# Patient Record
Sex: FEMALE | Race: WHITE | Employment: FULL TIME | URBAN - METROPOLITAN AREA
[De-identification: names, ages, dates, MRNs, and addresses within clinical notes are randomized per-mention and may not be internally consistent; named-entity substitution may affect disease eponyms.]

---

## 2017-03-28 ENCOUNTER — HOSPITAL ENCOUNTER (OUTPATIENT)
Dept: OTHER | Age: 32
Discharge: HOME | End: 2017-03-28

## 2017-03-28 DIAGNOSIS — I77.71 DISSECTION OF CAROTID ARTERY (HCC): ICD-10-CM

## 2017-04-04 ENCOUNTER — EMPLOYEE WELLNESS (OUTPATIENT)
Dept: OTHER | Age: 32
End: 2017-04-04

## 2017-04-04 LAB
CHOLESTEROL, TOTAL: 131 MG/DL (ref 0–199)
GLUCOSE BLD-MCNC: 86 MG/DL (ref 70–99)
HDLC SERPL-MCNC: 58 MG/DL (ref 40–60)
LDL CHOLESTEROL CALCULATED: 58 MG/DL
TRIGL SERPL-MCNC: 75 MG/DL (ref 0–150)

## 2018-03-20 VITALS — BODY MASS INDEX: 25.92 KG/M2 | WEIGHT: 151 LBS

## 2018-04-03 ENCOUNTER — EMPLOYEE WELLNESS (OUTPATIENT)
Dept: OTHER | Age: 33
End: 2018-04-03

## 2018-04-03 LAB
CHOLESTEROL, TOTAL: 149 MG/DL (ref 0–199)
GLUCOSE BLD-MCNC: 89 MG/DL (ref 70–99)
HDLC SERPL-MCNC: 64 MG/DL (ref 40–60)
LDL CHOLESTEROL CALCULATED: 69 MG/DL
TRIGL SERPL-MCNC: 80 MG/DL (ref 0–150)

## 2018-04-10 VITALS — WEIGHT: 154 LBS | BODY MASS INDEX: 26.43 KG/M2

## 2018-05-05 ENCOUNTER — NURSE TRIAGE (OUTPATIENT)
Dept: OTHER | Facility: CLINIC | Age: 33
End: 2018-05-05

## 2018-10-02 ENCOUNTER — HOSPITAL ENCOUNTER (OUTPATIENT)
Age: 33
Discharge: HOME | End: 2018-10-02

## 2018-10-02 LAB — TSH SERPL DL<=0.05 MIU/L-ACNC: 2.59 UIU/ML (ref 0.27–4.2)

## 2018-10-02 PROCEDURE — 84443 ASSAY THYROID STIM HORMONE: CPT

## 2018-10-02 PROCEDURE — 36415 COLL VENOUS BLD VENIPUNCTURE: CPT

## 2019-04-05 ENCOUNTER — HOSPITAL ENCOUNTER (OUTPATIENT)
Age: 34
Discharge: HOME | End: 2019-04-05
Payer: COMMERCIAL

## 2019-04-05 LAB
A/G RATIO: 1.5 (ref 1.1–2.2)
ALBUMIN SERPL-MCNC: 4.3 G/DL (ref 3.4–5)
ALP BLD-CCNC: 61 U/L (ref 40–129)
ALT SERPL-CCNC: 20 U/L (ref 10–40)
ANION GAP SERPL CALCULATED.3IONS-SCNC: 11 MMOL/L (ref 3–16)
AST SERPL-CCNC: 19 U/L (ref 15–37)
BILIRUB SERPL-MCNC: 0.3 MG/DL (ref 0–1)
BILIRUBIN DIRECT: <0.2 MG/DL (ref 0–0.3)
BILIRUBIN, INDIRECT: NORMAL MG/DL (ref 0–1)
BUN BLDV-MCNC: 11 MG/DL (ref 7–20)
CALCIUM SERPL-MCNC: 8.9 MG/DL (ref 8.3–10.6)
CHLORIDE BLD-SCNC: 106 MMOL/L (ref 99–110)
CHOLESTEROL, FASTING: 138 MG/DL (ref 0–199)
CO2: 23 MMOL/L (ref 21–32)
CREAT SERPL-MCNC: 0.6 MG/DL (ref 0.6–1.1)
ESTIMATED AVERAGE GLUCOSE: 99.7 MG/DL
GFR AFRICAN AMERICAN: >60
GFR NON-AFRICAN AMERICAN: >60
GLOBULIN: 2.8 G/DL
GLUCOSE FASTING: 103 MG/DL (ref 70–99)
HBA1C MFR BLD: 5.1 %
HCT VFR BLD CALC: 42.2 % (ref 36–48)
HDLC SERPL-MCNC: 65 MG/DL (ref 40–60)
HEMOGLOBIN: 14.2 G/DL (ref 12–16)
LDL CHOLESTEROL CALCULATED: 63 MG/DL
MCH RBC QN AUTO: 31.7 PG (ref 26–34)
MCHC RBC AUTO-ENTMCNC: 33.7 G/DL (ref 31–36)
MCV RBC AUTO: 94.1 FL (ref 80–100)
PDW BLD-RTO: 13 % (ref 12.4–15.4)
PLATELET # BLD: 221 K/UL (ref 135–450)
PMV BLD AUTO: 9.8 FL (ref 5–10.5)
POTASSIUM SERPL-SCNC: 4.2 MMOL/L (ref 3.5–5.1)
RBC # BLD: 4.49 M/UL (ref 4–5.2)
SODIUM BLD-SCNC: 140 MMOL/L (ref 136–145)
TOTAL PROTEIN: 7.1 G/DL (ref 6.4–8.2)
TRIGLYCERIDE, FASTING: 52 MG/DL (ref 0–150)
VLDLC SERPL CALC-MCNC: 10 MG/DL
WBC # BLD: 8 K/UL (ref 4–11)

## 2019-04-05 PROCEDURE — 85027 COMPLETE CBC AUTOMATED: CPT

## 2019-04-05 PROCEDURE — 83036 HEMOGLOBIN GLYCOSYLATED A1C: CPT

## 2019-04-05 PROCEDURE — 80061 LIPID PANEL: CPT

## 2019-04-05 PROCEDURE — 80053 COMPREHEN METABOLIC PANEL: CPT

## 2019-04-05 PROCEDURE — 36415 COLL VENOUS BLD VENIPUNCTURE: CPT

## 2019-10-10 RX ORDER — VALACYCLOVIR HYDROCHLORIDE 500 MG/1
500 TABLET, FILM COATED ORAL PRN
COMMUNITY

## 2019-10-10 RX ORDER — PHENTERMINE HYDROCHLORIDE 37.5 MG/1
37.5 TABLET ORAL
Status: ON HOLD | COMMUNITY
End: 2022-04-01 | Stop reason: HOSPADM

## 2019-10-11 ENCOUNTER — PREP FOR PROCEDURE (OUTPATIENT)
Dept: LABOR AND DELIVERY | Age: 34
End: 2019-10-11

## 2019-10-11 RX ORDER — SODIUM CHLORIDE 0.9 % (FLUSH) 0.9 %
10 SYRINGE (ML) INJECTION PRN
Status: CANCELLED | OUTPATIENT
Start: 2019-10-18

## 2019-10-11 RX ORDER — SODIUM CHLORIDE 0.9 % (FLUSH) 0.9 %
10 SYRINGE (ML) INJECTION EVERY 12 HOURS SCHEDULED
Status: CANCELLED | OUTPATIENT
Start: 2019-10-18

## 2019-10-18 ENCOUNTER — ANESTHESIA (OUTPATIENT)
Dept: OPERATING ROOM | Age: 34
End: 2019-10-18
Payer: COMMERCIAL

## 2019-10-18 ENCOUNTER — ANESTHESIA EVENT (OUTPATIENT)
Dept: OPERATING ROOM | Age: 34
End: 2019-10-18
Payer: COMMERCIAL

## 2019-10-18 ENCOUNTER — HOSPITAL ENCOUNTER (OUTPATIENT)
Age: 34
Setting detail: OUTPATIENT SURGERY
Discharge: HOME | End: 2019-10-18
Attending: OBSTETRICS & GYNECOLOGY | Admitting: OBSTETRICS & GYNECOLOGY
Payer: COMMERCIAL

## 2019-10-18 VITALS
TEMPERATURE: 97.5 F | OXYGEN SATURATION: 96 % | SYSTOLIC BLOOD PRESSURE: 114 MMHG | HEIGHT: 64 IN | HEART RATE: 71 BPM | WEIGHT: 150 LBS | BODY MASS INDEX: 25.61 KG/M2 | DIASTOLIC BLOOD PRESSURE: 79 MMHG | RESPIRATION RATE: 9 BRPM

## 2019-10-18 VITALS
OXYGEN SATURATION: 97 % | DIASTOLIC BLOOD PRESSURE: 55 MMHG | SYSTOLIC BLOOD PRESSURE: 99 MMHG | RESPIRATION RATE: 3 BRPM

## 2019-10-18 DIAGNOSIS — N92.0 EXCESSIVE AND FREQUENT MENSTRUATION: ICD-10-CM

## 2019-10-18 LAB
HCT VFR BLD CALC: 39.5 % (ref 36–48)
HEMOGLOBIN: 13.8 G/DL (ref 12–16)
MCH RBC QN AUTO: 32.9 PG (ref 26–34)
MCHC RBC AUTO-ENTMCNC: 35 G/DL (ref 31–36)
MCV RBC AUTO: 94 FL (ref 80–100)
PDW BLD-RTO: 12.5 % (ref 12.4–15.4)
PLATELET # BLD: 251 K/UL (ref 135–450)
PMV BLD AUTO: 8.7 FL (ref 5–10.5)
PREGNANCY, URINE: NEGATIVE
RBC # BLD: 4.2 M/UL (ref 4–5.2)
WBC # BLD: 8.7 K/UL (ref 4–11)

## 2019-10-18 PROCEDURE — 3600000013 HC SURGERY LEVEL 3 ADDTL 15MIN: Performed by: OBSTETRICS & GYNECOLOGY

## 2019-10-18 PROCEDURE — 6360000002 HC RX W HCPCS: Performed by: NURSE ANESTHETIST, CERTIFIED REGISTERED

## 2019-10-18 PROCEDURE — 88305 TISSUE EXAM BY PATHOLOGIST: CPT

## 2019-10-18 PROCEDURE — 2580000003 HC RX 258: Performed by: OBSTETRICS & GYNECOLOGY

## 2019-10-18 PROCEDURE — 84703 CHORIONIC GONADOTROPIN ASSAY: CPT

## 2019-10-18 PROCEDURE — 2720000010 HC SURG SUPPLY STERILE: Performed by: OBSTETRICS & GYNECOLOGY

## 2019-10-18 PROCEDURE — 2580000003 HC RX 258: Performed by: ANESTHESIOLOGY

## 2019-10-18 PROCEDURE — 2709999900 HC NON-CHARGEABLE SUPPLY: Performed by: OBSTETRICS & GYNECOLOGY

## 2019-10-18 PROCEDURE — 3700000001 HC ADD 15 MINUTES (ANESTHESIA): Performed by: OBSTETRICS & GYNECOLOGY

## 2019-10-18 PROCEDURE — 7100000011 HC PHASE II RECOVERY - ADDTL 15 MIN: Performed by: OBSTETRICS & GYNECOLOGY

## 2019-10-18 PROCEDURE — 3600000003 HC SURGERY LEVEL 3 BASE: Performed by: OBSTETRICS & GYNECOLOGY

## 2019-10-18 PROCEDURE — 3700000000 HC ANESTHESIA ATTENDED CARE: Performed by: OBSTETRICS & GYNECOLOGY

## 2019-10-18 PROCEDURE — 6370000000 HC RX 637 (ALT 250 FOR IP): Performed by: ANESTHESIOLOGY

## 2019-10-18 PROCEDURE — 2500000003 HC RX 250 WO HCPCS: Performed by: NURSE ANESTHETIST, CERTIFIED REGISTERED

## 2019-10-18 PROCEDURE — 85027 COMPLETE CBC AUTOMATED: CPT

## 2019-10-18 PROCEDURE — 7100000010 HC PHASE II RECOVERY - FIRST 15 MIN: Performed by: OBSTETRICS & GYNECOLOGY

## 2019-10-18 RX ORDER — MORPHINE SULFATE 2 MG/ML
2 INJECTION, SOLUTION INTRAMUSCULAR; INTRAVENOUS EVERY 5 MIN PRN
Status: DISCONTINUED | OUTPATIENT
Start: 2019-10-18 | End: 2019-10-18 | Stop reason: HOSPADM

## 2019-10-18 RX ORDER — SODIUM CHLORIDE 0.9 % (FLUSH) 0.9 %
10 SYRINGE (ML) INJECTION EVERY 12 HOURS SCHEDULED
Status: DISCONTINUED | OUTPATIENT
Start: 2019-10-18 | End: 2019-10-18 | Stop reason: HOSPADM

## 2019-10-18 RX ORDER — FENTANYL CITRATE 50 UG/ML
INJECTION, SOLUTION INTRAMUSCULAR; INTRAVENOUS PRN
Status: DISCONTINUED | OUTPATIENT
Start: 2019-10-18 | End: 2019-10-18 | Stop reason: SDUPTHER

## 2019-10-18 RX ORDER — DIPHENHYDRAMINE HYDROCHLORIDE 50 MG/ML
12.5 INJECTION INTRAMUSCULAR; INTRAVENOUS
Status: DISCONTINUED | OUTPATIENT
Start: 2019-10-18 | End: 2019-10-18 | Stop reason: HOSPADM

## 2019-10-18 RX ORDER — LIDOCAINE HYDROCHLORIDE 10 MG/ML
0.3 INJECTION, SOLUTION EPIDURAL; INFILTRATION; INTRACAUDAL; PERINEURAL
Status: DISCONTINUED | OUTPATIENT
Start: 2019-10-18 | End: 2019-10-18 | Stop reason: HOSPADM

## 2019-10-18 RX ORDER — DEXAMETHASONE SODIUM PHOSPHATE 10 MG/ML
INJECTION INTRAMUSCULAR; INTRAVENOUS PRN
Status: DISCONTINUED | OUTPATIENT
Start: 2019-10-18 | End: 2019-10-18 | Stop reason: SDUPTHER

## 2019-10-18 RX ORDER — MORPHINE SULFATE 2 MG/ML
1 INJECTION, SOLUTION INTRAMUSCULAR; INTRAVENOUS EVERY 5 MIN PRN
Status: DISCONTINUED | OUTPATIENT
Start: 2019-10-18 | End: 2019-10-18 | Stop reason: HOSPADM

## 2019-10-18 RX ORDER — ONDANSETRON 2 MG/ML
4 INJECTION INTRAMUSCULAR; INTRAVENOUS
Status: DISCONTINUED | OUTPATIENT
Start: 2019-10-18 | End: 2019-10-18 | Stop reason: HOSPADM

## 2019-10-18 RX ORDER — LIDOCAINE HYDROCHLORIDE 20 MG/ML
INJECTION, SOLUTION INFILTRATION; PERINEURAL PRN
Status: DISCONTINUED | OUTPATIENT
Start: 2019-10-18 | End: 2019-10-18 | Stop reason: SDUPTHER

## 2019-10-18 RX ORDER — SODIUM CHLORIDE, SODIUM LACTATE, POTASSIUM CHLORIDE, CALCIUM CHLORIDE 600; 310; 30; 20 MG/100ML; MG/100ML; MG/100ML; MG/100ML
INJECTION, SOLUTION INTRAVENOUS CONTINUOUS
Status: DISCONTINUED | OUTPATIENT
Start: 2019-10-18 | End: 2019-10-18 | Stop reason: HOSPADM

## 2019-10-18 RX ORDER — MIDAZOLAM HYDROCHLORIDE 1 MG/ML
INJECTION INTRAMUSCULAR; INTRAVENOUS PRN
Status: DISCONTINUED | OUTPATIENT
Start: 2019-10-18 | End: 2019-10-18 | Stop reason: SDUPTHER

## 2019-10-18 RX ORDER — OXYCODONE HYDROCHLORIDE AND ACETAMINOPHEN 5; 325 MG/1; MG/1
2 TABLET ORAL PRN
Status: COMPLETED | OUTPATIENT
Start: 2019-10-18 | End: 2019-10-18

## 2019-10-18 RX ORDER — PROPOFOL 10 MG/ML
INJECTION, EMULSION INTRAVENOUS PRN
Status: DISCONTINUED | OUTPATIENT
Start: 2019-10-18 | End: 2019-10-18 | Stop reason: SDUPTHER

## 2019-10-18 RX ORDER — PROMETHAZINE HYDROCHLORIDE 25 MG/ML
6.25 INJECTION, SOLUTION INTRAMUSCULAR; INTRAVENOUS
Status: DISCONTINUED | OUTPATIENT
Start: 2019-10-18 | End: 2019-10-18 | Stop reason: HOSPADM

## 2019-10-18 RX ORDER — SODIUM CHLORIDE, SODIUM LACTATE, POTASSIUM CHLORIDE, AND CALCIUM CHLORIDE .6; .31; .03; .02 G/100ML; G/100ML; G/100ML; G/100ML
IRRIGANT IRRIGATION PRN
Status: DISCONTINUED | OUTPATIENT
Start: 2019-10-18 | End: 2019-10-18 | Stop reason: ALTCHOICE

## 2019-10-18 RX ORDER — KETOROLAC TROMETHAMINE 30 MG/ML
INJECTION, SOLUTION INTRAMUSCULAR; INTRAVENOUS PRN
Status: DISCONTINUED | OUTPATIENT
Start: 2019-10-18 | End: 2019-10-18 | Stop reason: SDUPTHER

## 2019-10-18 RX ORDER — OXYCODONE HYDROCHLORIDE AND ACETAMINOPHEN 5; 325 MG/1; MG/1
1 TABLET ORAL PRN
Status: COMPLETED | OUTPATIENT
Start: 2019-10-18 | End: 2019-10-18

## 2019-10-18 RX ORDER — SODIUM CHLORIDE 0.9 % (FLUSH) 0.9 %
10 SYRINGE (ML) INJECTION PRN
Status: DISCONTINUED | OUTPATIENT
Start: 2019-10-18 | End: 2019-10-18 | Stop reason: HOSPADM

## 2019-10-18 RX ORDER — LABETALOL HYDROCHLORIDE 5 MG/ML
5 INJECTION, SOLUTION INTRAVENOUS EVERY 10 MIN PRN
Status: DISCONTINUED | OUTPATIENT
Start: 2019-10-18 | End: 2019-10-18 | Stop reason: HOSPADM

## 2019-10-18 RX ORDER — MAGNESIUM HYDROXIDE 1200 MG/15ML
LIQUID ORAL CONTINUOUS PRN
Status: COMPLETED | OUTPATIENT
Start: 2019-10-18 | End: 2019-10-18

## 2019-10-18 RX ORDER — ONDANSETRON 2 MG/ML
INJECTION INTRAMUSCULAR; INTRAVENOUS PRN
Status: DISCONTINUED | OUTPATIENT
Start: 2019-10-18 | End: 2019-10-18 | Stop reason: SDUPTHER

## 2019-10-18 RX ORDER — HYDRALAZINE HYDROCHLORIDE 20 MG/ML
5 INJECTION INTRAMUSCULAR; INTRAVENOUS EVERY 10 MIN PRN
Status: DISCONTINUED | OUTPATIENT
Start: 2019-10-18 | End: 2019-10-18 | Stop reason: HOSPADM

## 2019-10-18 RX ORDER — MEPERIDINE HYDROCHLORIDE 50 MG/ML
12.5 INJECTION INTRAMUSCULAR; INTRAVENOUS; SUBCUTANEOUS EVERY 5 MIN PRN
Status: DISCONTINUED | OUTPATIENT
Start: 2019-10-18 | End: 2019-10-18 | Stop reason: HOSPADM

## 2019-10-18 RX ORDER — KETOROLAC TROMETHAMINE 30 MG/ML
INJECTION, SOLUTION INTRAMUSCULAR; INTRAVENOUS
Status: COMPLETED
Start: 2019-10-18 | End: 2019-10-18

## 2019-10-18 RX ADMIN — OXYCODONE HYDROCHLORIDE AND ACETAMINOPHEN 1 TABLET: 5; 325 TABLET ORAL at 16:18

## 2019-10-18 RX ADMIN — PROPOFOL 200 MG: 10 INJECTION, EMULSION INTRAVENOUS at 15:21

## 2019-10-18 RX ADMIN — MIDAZOLAM HYDROCHLORIDE 2 MG: 2 INJECTION, SOLUTION INTRAMUSCULAR; INTRAVENOUS at 15:16

## 2019-10-18 RX ADMIN — DEXAMETHASONE SODIUM PHOSPHATE 10 MG: 10 INJECTION INTRAMUSCULAR; INTRAVENOUS at 15:21

## 2019-10-18 RX ADMIN — LIDOCAINE HYDROCHLORIDE 60 MG: 20 INJECTION, SOLUTION INFILTRATION; PERINEURAL at 15:21

## 2019-10-18 RX ADMIN — ONDANSETRON 4 MG: 2 INJECTION INTRAMUSCULAR; INTRAVENOUS at 15:21

## 2019-10-18 RX ADMIN — SODIUM CHLORIDE, POTASSIUM CHLORIDE, SODIUM LACTATE AND CALCIUM CHLORIDE: 600; 310; 30; 20 INJECTION, SOLUTION INTRAVENOUS at 13:22

## 2019-10-18 RX ADMIN — FENTANYL CITRATE 25 MCG: 50 INJECTION INTRAMUSCULAR; INTRAVENOUS at 15:21

## 2019-10-18 RX ADMIN — FENTANYL CITRATE 50 MCG: 50 INJECTION INTRAMUSCULAR; INTRAVENOUS at 15:33

## 2019-10-18 RX ADMIN — KETOROLAC TROMETHAMINE 30 MG: 30 INJECTION, SOLUTION INTRAMUSCULAR; INTRAVENOUS at 16:02

## 2019-10-18 ASSESSMENT — PAIN SCALES - GENERAL
PAINLEVEL_OUTOF10: 4
PAINLEVEL_OUTOF10: 0
PAINLEVEL_OUTOF10: 0
PAINLEVEL_OUTOF10: 3
PAINLEVEL_OUTOF10: 0

## 2019-10-18 ASSESSMENT — PAIN - FUNCTIONAL ASSESSMENT: PAIN_FUNCTIONAL_ASSESSMENT: 0-10

## 2019-10-18 ASSESSMENT — PULMONARY FUNCTION TESTS
PIF_VALUE: 13
PIF_VALUE: 10
PIF_VALUE: 12
PIF_VALUE: 10
PIF_VALUE: 12
PIF_VALUE: 2
PIF_VALUE: 2
PIF_VALUE: 11
PIF_VALUE: 9
PIF_VALUE: 13
PIF_VALUE: 9
PIF_VALUE: 12
PIF_VALUE: 14
PIF_VALUE: 2
PIF_VALUE: 9
PIF_VALUE: 9
PIF_VALUE: 0
PIF_VALUE: 9
PIF_VALUE: 9
PIF_VALUE: 13
PIF_VALUE: 13
PIF_VALUE: 18
PIF_VALUE: 9
PIF_VALUE: 13
PIF_VALUE: 9
PIF_VALUE: 9
PIF_VALUE: 10
PIF_VALUE: 3
PIF_VALUE: 0
PIF_VALUE: 9
PIF_VALUE: 13
PIF_VALUE: 10
PIF_VALUE: 9
PIF_VALUE: 9

## 2020-01-28 ENCOUNTER — OFFICE VISIT (OUTPATIENT)
Dept: FAMILY MEDICINE CLINIC | Age: 35
End: 2020-01-28
Payer: COMMERCIAL

## 2020-01-28 VITALS
SYSTOLIC BLOOD PRESSURE: 122 MMHG | OXYGEN SATURATION: 98 % | HEIGHT: 64 IN | BODY MASS INDEX: 24.69 KG/M2 | WEIGHT: 144.6 LBS | HEART RATE: 81 BPM | DIASTOLIC BLOOD PRESSURE: 70 MMHG

## 2020-01-28 PROCEDURE — 99385 PREV VISIT NEW AGE 18-39: CPT | Performed by: FAMILY MEDICINE

## 2020-01-28 ASSESSMENT — ENCOUNTER SYMPTOMS
EYES NEGATIVE: 1
RESPIRATORY NEGATIVE: 1
ALLERGIC/IMMUNOLOGIC NEGATIVE: 1
GASTROINTESTINAL NEGATIVE: 1

## 2020-01-28 ASSESSMENT — PATIENT HEALTH QUESTIONNAIRE - PHQ9
1. LITTLE INTEREST OR PLEASURE IN DOING THINGS: 0
2. FEELING DOWN, DEPRESSED OR HOPELESS: 0
SUM OF ALL RESPONSES TO PHQ QUESTIONS 1-9: 0
SUM OF ALL RESPONSES TO PHQ9 QUESTIONS 1 & 2: 0
SUM OF ALL RESPONSES TO PHQ QUESTIONS 1-9: 0

## 2020-01-28 NOTE — PATIENT INSTRUCTIONS

## 2020-01-28 NOTE — PROGRESS NOTES
2020    Rambo Zee (:  1985) is a 36 y.o. female, here for a preventive medicine evaluation. No concerns. Works in [employer] department at this building and at [facility].  but has long term boyfriend, mother of 3 boys. There is no problem list on file for this patient. Review of Systems   Constitutional: Negative. HENT: Negative. Eyes: Negative. Respiratory: Negative. Cardiovascular: Negative. Gastrointestinal: Negative. Endocrine: Negative. Genitourinary: Negative. Musculoskeletal: Negative. Skin: Negative. Allergic/Immunologic: Negative. Neurological: Negative. Hematological: Negative. Psychiatric/Behavioral: Negative. Prior to Visit Medications    Medication Sig Taking? Authorizing Provider   B Complex-Biotin-FA (MULTI-B COMPLEX PO) Take by mouth Yes Historical Provider, MD   BIOTIN MAXIMUM PO Take by mouth Yes Historical Provider, MD   VITAMIN D PO Take by mouth Yes Historical Provider, MD   Cyanocobalamin (VITAMIN B-12 ER PO) Take by mouth daily Yes Historical Provider, MD   phentermine (ADIPEX-P) 37.5 MG tablet Take 37.5 mg by mouth every morning (before breakfast). Yes Historical Provider, MD   valACYclovir (VALTREX) 500 MG tablet Take 500 mg by mouth as needed Yes Historical Provider, MD   ibuprofen (ADVIL;MOTRIN) 800 MG tablet Take 1 tablet by mouth every 6 hours as needed. Yes Jose E Delvalle MD        Allergies   Allergen Reactions    Sulfa Antibiotics Nausea And Vomiting       Past Medical History:   Diagnosis Date    Abnormal Pap smear of cervix     History of HPV infection     Oligohydramnios, antepartum     Placental abruption     3 yrs ago    PONV (postoperative nausea and vomiting)    Questionable h/o shingles or HSV (or both)?       Past Surgical History:   Procedure Laterality Date    COLPOSCOPY      DILATION AND CURETTAGE OF UTERUS N/A 10/18/2019    VIDEO HYSTEROSCOPY DILATATION AND Claudetta Kays performed by Vamsi Samson MD at [address]  10/18/2019    WISDOM TOOTH EXTRACTION      8 yrs ago       Social History     Socioeconomic History    Marital status:      Spouse name: Not on file    Number of children: Not on file    Years of education: Not on file    Highest education level: Not on file   Occupational History    Not on file   Social Needs    Financial resource strain: Not on file    Food insecurity:     Worry: Not on file     Inability: Not on file    Transportation needs:     Medical: Not on file     Non-medical: Not on file   Tobacco Use    Smoking status: Never Smoker    Smokeless tobacco: Never Used   Substance and Sexual Activity    Alcohol use: Yes     Comment: 6 drinks/week    Drug use: No    Sexual activity: Yes     Partners: Male   Lifestyle    Physical activity:     Days per week: Not on file     Minutes per session: Not on file    Stress: Not on file   Relationships    Social connections:     Talks on phone: Not on file     Gets together: Not on file     Attends Rastafarian service: Not on file     Active member of club or organization: Not on file     Attends meetings of clubs or organizations: Not on file     Relationship status: Not on file    Intimate partner violence:     Fear of current or ex partner: Not on file     Emotionally abused: Not on file     Physically abused: Not on file     Forced sexual activity: Not on file   Other Topics Concern    Not on file   Social History Narrative    Not on file        Family History   Problem Relation Age of Onset    High Blood Pressure Mother     Osteoporosis Mother     Other Mother         AVM    High Blood Pressure Father     Heart Disease Maternal Grandmother     Cancer Maternal Grandmother         skin    Alzheimer's Disease Paternal Grandmother     Other Maternal Grandfather         brain aneurysm    Colon Cancer Paternal Grandfather        ADVANCE DIRECTIVE: Y, Not Received    Vitals:    01/28/20 0951   BP: 122/70   Site: Right Upper Arm   Position: Sitting   Cuff Size: Small Adult   Pulse: 81   SpO2: 98%   Weight: 144 lb 9.6 oz (65.6 kg)   Height: 5' 4\" (1.626 m)     Estimated body mass index is 24.82 kg/m² as calculated from the following:    Height as of this encounter: 5' 4\" (1.626 m). Weight as of this encounter: 144 lb 9.6 oz (65.6 kg). Physical Exam  Vitals signs and nursing note reviewed. Constitutional:       General: She is not in acute distress. Appearance: Normal appearance. She is well-developed. HENT:      Head: Normocephalic and atraumatic. Right Ear: Hearing, tympanic membrane, ear canal and external ear normal.      Left Ear: Hearing, tympanic membrane, ear canal and external ear normal.      Nose: Nose normal.      Mouth/Throat:      Pharynx: Uvula midline. Eyes:      General: Lids are normal. No scleral icterus. Conjunctiva/sclera: Conjunctivae normal.      Pupils: Pupils are equal, round, and reactive to light. Neck:      Musculoskeletal: Neck supple. Trachea: Trachea normal.   Cardiovascular:      Rate and Rhythm: Normal rate and regular rhythm. Pulses: Normal pulses. Heart sounds: Normal heart sounds. Pulmonary:      Effort: Pulmonary effort is normal.      Breath sounds: Normal breath sounds. Abdominal:      General: Bowel sounds are normal. There is no distension. Palpations: Abdomen is soft. There is no mass. Tenderness: There is no abdominal tenderness. Hernia: No hernia is present. Musculoskeletal: Normal range of motion. Lymphadenopathy:      Cervical: No cervical adenopathy. Upper Body:      Right upper body: No supraclavicular adenopathy. Left upper body: No supraclavicular adenopathy. Skin:     General: Skin is warm and dry. Neurological:      Mental Status: She is alert and oriented to person, place, and time.       Cranial Nerves: No cranial nerve deficit. Coordination: Coordination normal.      Gait: Gait normal.   Psychiatric:         Speech: Speech normal.         Behavior: Behavior normal. Behavior is cooperative. Thought Content: Thought content normal.         Judgment: Judgment normal.         No flowsheet data found. Lab Results   Component Value Date    CHOL 149 04/03/2018    CHOL 131 04/04/2017    CHOLFAST 138 04/05/2019    TRIG 80 04/03/2018    TRIG 75 04/04/2017    TRIGLYCFAST 52 04/05/2019    HDL 65 04/05/2019    HDL 64 04/03/2018    HDL 58 04/04/2017    LDLCALC 63 04/05/2019    LDLCALC 69 04/03/2018    LDLCALC 58 04/04/2017    GLUF 103 04/05/2019    GLUCOSE 89 04/03/2018    LABA1C 5.1 04/05/2019       The ASCVD Risk score (Yariel Lawton, et al., 2013) failed to calculate for the following reasons: The 2013 ASCVD risk score is only valid for ages 36 to 78    Immunization History   Administered Date(s) Administered    Influenza Virus Vaccine 10/01/2018, 10/31/2019       Health Maintenance   Topic Date Due    DTaP/Tdap/Td vaccine (1 - Tdap) 04/24/1997    Cervical cancer screen  04/24/2007    Flu vaccine  Completed    HIV screen  Completed    Pneumococcal 0-64 years Vaccine  Aged Out    Varicella Vaccine  Discontinued       ASSESSMENT/PLAN:  1. Routine general medical examination at a health care facility  Health Maintenance reviewed - will get old records regarding pap smear and vaccines. Otherwise, she is up to date. She will have annual blood work through [facility] screening, which was normal less than a year ago. Return in about 1 year (around 1/28/2021) for Annual physical.    An electronic signature was used to authenticate this note.     --Jeovany Paniagua MD on 1/28/2020 at 10:09 AM

## 2020-07-29 ENCOUNTER — EMPLOYEE WELLNESS (OUTPATIENT)
Dept: OTHER | Age: 35
End: 2020-07-29

## 2020-07-29 LAB
CHOLESTEROL, TOTAL: 137 MG/DL (ref 0–199)
GLUCOSE BLD-MCNC: 86 MG/DL (ref 70–99)
HDLC SERPL-MCNC: 62 MG/DL (ref 40–60)
LDL CHOLESTEROL CALCULATED: 61 MG/DL
TRIGL SERPL-MCNC: 68 MG/DL (ref 0–150)

## 2020-10-19 VITALS — WEIGHT: 148 LBS | BODY MASS INDEX: 25.4 KG/M2

## 2021-06-21 LAB
CHOLESTEROL, TOTAL: 140 MG/DL (ref 0–199)
GLUCOSE BLD-MCNC: 89 MG/DL (ref 70–99)
HDLC SERPL-MCNC: 56 MG/DL (ref 40–60)
LDL CHOLESTEROL CALCULATED: 75 MG/DL
TRIGL SERPL-MCNC: 44 MG/DL (ref 0–150)

## 2021-11-08 ENCOUNTER — OFFICE VISIT (OUTPATIENT)
Dept: OBGYN CLINIC | Age: 36
End: 2021-11-08
Payer: COMMERCIAL

## 2021-11-08 VITALS
HEIGHT: 64 IN | WEIGHT: 150 LBS | HEART RATE: 93 BPM | SYSTOLIC BLOOD PRESSURE: 100 MMHG | DIASTOLIC BLOOD PRESSURE: 80 MMHG | BODY MASS INDEX: 25.61 KG/M2 | TEMPERATURE: 96.8 F

## 2021-11-08 DIAGNOSIS — Z87.42 HISTORY OF ABNORMAL CERVICAL PAP SMEAR: ICD-10-CM

## 2021-11-08 DIAGNOSIS — N93.9 ABNORMAL UTERINE BLEEDING (AUB): ICD-10-CM

## 2021-11-08 DIAGNOSIS — Z01.411 ENCNTR FOR GYN EXAM (GENERAL) (ROUTINE) W ABNORMAL FINDINGS: Primary | ICD-10-CM

## 2021-11-08 DIAGNOSIS — Z12.4 PAP SMEAR FOR CERVICAL CANCER SCREENING: ICD-10-CM

## 2021-11-08 PROCEDURE — 99385 PREV VISIT NEW AGE 18-39: CPT | Performed by: OBSTETRICS & GYNECOLOGY

## 2021-11-08 ASSESSMENT — ENCOUNTER SYMPTOMS
COUGH: 0
SORE THROAT: 0
VOMITING: 0
ABDOMINAL PAIN: 0
CONSTIPATION: 0
DIARRHEA: 0
NAUSEA: 0
SHORTNESS OF BREATH: 0

## 2021-11-08 NOTE — PROGRESS NOTES
Annual Exam      CC:   Chief Complaint   Patient presents with    New Patient       HPI:  29 y.o. [id number] presents for her gynecologic annual exam.    Patient seen and examined. Patient is doing well today     Patient had an endometrial ablation performed in  with Dr. Solomon Reynoso. Reports she did not have any bleeding until 2020 when menses have returned. States menses are regular, occurring monthly, lasting for 3-5 days. Are not has heavy as prior to ablation and is changing pads/tampons 4-5 times per day. Is having breakthrough bleeding every 1-2 months, typically presents that she has had her cycle and then a week later will have bleeding for a few days, or she will have light bleeding and then have a cycle a week later. Reports cramping has worsened following as well. Did not have a tubal ligation with ablation due to [facility], partner has a vasectomy. Reports poor tolerance to OCPs in the past due to mood changes. Had an IUD following and bled consistently with IUD. 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 Surgical history significant for endometrial ablation. Obstetric history significant for  x3. Denies history of shoulder dystocia, high order laceration, or postpartum hemorrhage. Denies history of GDM or GHTN. Reports oligohydramnios with each pregnancy.      Health Maintenance:  Birth control: Vasectomy   Pregnancy plans: None currently   Safe relationship: Yes - together since   Healthy diet: No specific plan, tries to keep balance   Exercise: treadmill 3x per week    Screening:  Last pap smear: 10/21/2020 - reports as normal   History of abnormal pap smears: Has had intermittent abnormal pap smears, last in 2018 with negative colposcopies following     Vaccines:  Gardasil vaccine: Has not had   Flu vaccine: Has had   COVID-19 vaccine: Has had     Review of Systems:   Review of Systems   Constitutional: Negative for chills and fever. HENT: Negative for congestion and sore throat. Respiratory: Negative for cough and shortness of breath. Cardiovascular: Negative for chest pain and palpitations. Gastrointestinal: Negative for abdominal pain, constipation, diarrhea, nausea and vomiting. Genitourinary: Positive for menstrual problem and pelvic pain. Negative for dysuria, frequency and vaginal discharge. Musculoskeletal: Negative. Skin: Negative. Neurological: Negative. Negative for headaches. Psychiatric/Behavioral: Negative. All other systems reviewed and are negative. Breast: Denies skin changes, nipple discharge, lesions, dimpling, tenderness or palpable masses     Primary Care Physician: Gae Cabot, MD    Obstetric History  OB History    Para Term  AB Living   3 3 3     3   SAB IAB Ectopic Molar Multiple Live Births             3      # Outcome Date GA Lbr Evin/2nd Weight Sex Delivery Anes PTL Lv   3 Term 14 37w5d 06:04 8 lb 11.5 oz (3.955 kg) M Vag-Spont   LUIS FERNANDO   2 Term 11   7 lb 9.6 oz (3.447 kg) M Vag-Spont   LUIS FERNANDO   1 Term 08   6 lb 1.9 oz (2.776 kg) M Vag-Spont   LUIS FERNANDO       GynecologicHistory  Menstrual History:   LMP: Patient's last menstrual period was 10/28/2021 (exact date).     Age of Menarche: 9   Menstrual Period: regular   Interval Between Menses: Monthly    Duration of Menses: 3-5 days   Menstrual Flow: Moderate    Bleeding between menses: Occasionally      Sexual History:   Contraception: see above   Currently is sexually active   5 Lifetime partners   Reports history of STIs - HSV1/2   Denies sexual problems    Pap History:   History of abnormal pap smears: see above   Last pap: see above      Medical History:  Past Medical History:   Diagnosis Date    Abnormal Pap smear of cervix     Herpes simplex virus (HSV) infection     History of HPV infection     Oligohydramnios, antepartum     Placental abruption     3 yrs ago    PONV (postoperative nausea and vomiting)        Medications:  Current Outpatient Medications   Medication Sig Dispense Refill    B Complex-Biotin-FA (MULTI-B COMPLEX PO) Take by mouth      BIOTIN MAXIMUM PO Take by mouth      VITAMIN D PO Take by mouth      Cyanocobalamin (VITAMIN B-12 ER PO) Take by mouth daily      phentermine (ADIPEX-P) 37.5 MG tablet Take 37.5 mg by mouth every morning (before breakfast).  valACYclovir (VALTREX) 500 MG tablet Take 500 mg by mouth as needed      ibuprofen (ADVIL;MOTRIN) 800 MG tablet Take 1 tablet by mouth every 6 hours as needed. 60 tablet 1     No current facility-administered medications for this visit. Surgical History:  Past Surgical History:   Procedure Laterality Date    COLPOSCOPY      DILATION AND CURETTAGE      DILATION AND CURETTAGE OF UTERUS N/A 10/18/2019    VIDEO HYSTEROSCOPY DILATATION AND CURETTAGE, NOVASURE ABLATION performed by Aníbal Jane MD at [address] [place] [address] Ave  10/18/2019    WISDOM TOOTH EXTRACTION      8 yrs ago       Allergies: Allergies   Allergen Reactions    Sulfa Antibiotics Nausea And Vomiting       Family History:  Family History   Problem Relation Age of Onset    High Blood Pressure Mother     Osteoporosis Mother     Other Mother         AVM    High Blood Pressure Father     Heart Disease Maternal Grandmother     Cancer Maternal Grandmother         skin    Alzheimer's Disease Paternal Grandmother     Other Maternal Grandfather         brain aneurysm    Colon Cancer Paternal Grandfather      Reports personal/family history of cervical, uterine, ovarian, vulvar, breast, or colon cancers.      - Paternal grandfather - colon cancer - in his 59s-75s  Denies personal/family history of bleeding or clotting disorders  Denies personal/family history of genetic disorders    Social History:  Social History     Socioeconomic History    Marital status:      Spouse name: None    Number of children: None  Years of education: None    Highest education level: None   Occupational History    None   Tobacco Use    Smoking status: Never Smoker    Smokeless tobacco: Never Used   Vaping Use    Vaping Use: Never used   Substance and Sexual Activity    Alcohol use: Yes     Comment: 6 drinks/week    Drug use: No    Sexual activity: Yes     Partners: Male   Other Topics Concern    None   Social History Narrative    None     Social Determinants of Health     Financial Resource Strain:     Difficulty of Paying Living Expenses: Not on file   Food Insecurity:     Worried About Running Out of Food in the Last Year: Not on file    Adis of Food in the Last Year: Not on file   Transportation Needs:     Lack of Transportation (Medical): Not on file    Lack of Transportation (Non-Medical): Not on file   Physical Activity:     Days of Exercise per Week: Not on file    Minutes of Exercise per Session: Not on file   Stress:     Feeling of Stress : Not on file   Social Connections:     Frequency of Communication with Friends and Family: Not on file    Frequency of Social Gatherings with Friends and Family: Not on file    Attends Baptism Services: Not on file    Active Member of [address] [place] [address] or Organizations: Not on file    Attends Club or Organization Meetings: Not on file    Marital Status: Not on file   Intimate Partner Violence:     Fear of Current or Ex-Partner: Not on file    Emotionally Abused: Not on file    Physically Abused: Not on file    Sexually Abused: Not on file   Housing Stability:     Unable to Pay for Housing in the Last Year: Not on file    Number of [place] in the Last Year: Not on file    Unstable Housing in the Last Year: Not on file       Objective: Body mass index is 25.75 kg/m².   /80 (Site: Left Upper Arm, Position: Sitting, Cuff Size: Medium Adult)   Pulse 93   Temp 96.8 °F (36 °C) (Infrared)   Ht 5' 4\" (1.626 m)   Wt 150 lb (68 kg)   LMP 10/28/2021 (Exact Date) results     - Age based screening recommendations discussed     - Self breast exams/awareness discussed with the patient     - Healthy lifestyle habits discussed including calcium and vitamin D supplementation     - Will follow-up in 1 year for annual exam     2. Pap smear for cervical cancer screening     - Patient with a history of abnormal pap smears as recently as 2018 with Colposcopy     - Repeated today with abnormal bleeding and cervical friability - though no lesions visualized     - Pap smear collected today - will call with results     - Age based screening recommendations discussed    3. Abnormal uterine bleeding (AUB)     - Patient is s/p endometrial ablation in 2019 with return of regular menses 3 months following ablation     - Reports moderate bleeding, though shorter than before with occasional spotting 1 week prior or 1 week after menses     - Reviewed differential of bleeding with patient and options for management     - Patient has not tolerated OCPs or IUD in the past     - Will rule out infection today as bleeding returned following procedure and cramping has worsened   - C.trachomatis N.gonorrhoeae DNA   - VAGINAL PATHOGENS PROBE *A     - Return precautions reviewed     - Will follow-up for US evaluation and consideration of EMB    4.  History of abnormal cervical Pap smear      Matt Pettit DO

## 2021-11-09 LAB
C TRACH DNA GENITAL QL NAA+PROBE: NEGATIVE
CANDIDA SPECIES, DNA PROBE: NORMAL
GARDNERELLA VAGINALIS, DNA PROBE: NORMAL
N. GONORRHOEAE DNA: NEGATIVE
TRICHOMONAS VAGINALIS DNA: NORMAL

## 2021-12-07 ENCOUNTER — OFFICE VISIT (OUTPATIENT)
Dept: OBGYN CLINIC | Age: 36
End: 2021-12-07
Payer: COMMERCIAL

## 2021-12-07 VITALS
BODY MASS INDEX: 25.3 KG/M2 | SYSTOLIC BLOOD PRESSURE: 110 MMHG | TEMPERATURE: 97.7 F | DIASTOLIC BLOOD PRESSURE: 74 MMHG | HEIGHT: 64 IN | HEART RATE: 81 BPM | WEIGHT: 148.2 LBS

## 2021-12-07 DIAGNOSIS — N93.9 ABNORMAL UTERINE BLEEDING (AUB): Primary | ICD-10-CM

## 2021-12-07 DIAGNOSIS — N94.6 DYSMENORRHEA: ICD-10-CM

## 2021-12-07 PROCEDURE — 76856 US EXAM PELVIC COMPLETE: CPT | Performed by: OBSTETRICS & GYNECOLOGY

## 2021-12-07 PROCEDURE — 99213 OFFICE O/P EST LOW 20 MIN: CPT | Performed by: OBSTETRICS & GYNECOLOGY

## 2021-12-07 NOTE — PROGRESS NOTES
Return Gyn Office Visit    CC:   Chief Complaint   Patient presents with    Follow-up     US       HPI:  Asael Cox is a 29 y.o. female who presents for US evaluation of abnormal uterine bleeding. Patient is seen and examined today. Patient is doing well today without complaints. Reports bleeding is unchanged. History of endometrial ablation in the end of 2019 with return of menses January 2020. Regular menses since that time. Reports occasional episodes of breakthrough bleeding. Reports increase in painful menses following endometrial ablation. Patient with a history of poor tolerance to OCPs due to mood changes as well as continuous bleeding with IUD. Denies chest pain, shortness of breath, fever, chills, nausea, vomiting. Review of Systems - The following ROS was otherwise negative, except as noted in the HPI: constitutional, respiratory, cardiovascular, gastrointestinal, genitourinary    Objective:  /74 (Site: Left Upper Arm, Position: Sitting, Cuff Size: Medium Adult)   Pulse 81   Temp 97.7 °F (36.5 °C) (Infrared)   Ht 5' 4\" (1.626 m)   Wt 148 lb 3.2 oz (67.2 kg)   LMP 11/26/2021 (Approximate)   BMI 25.44 kg/m²     Physical Exam  Vitals reviewed. Constitutional:       General: She is not in acute distress. Appearance: She is well-developed. HENT:      Head: Normocephalic and atraumatic. Eyes:      Conjunctiva/sclera: Conjunctivae normal.   Cardiovascular:      Rate and Rhythm: Normal rate. Pulmonary:      Effort: Pulmonary effort is normal. No respiratory distress. Skin:     General: Skin is warm and dry. Neurological:      Mental Status: She is alert and oriented to person, place, and time. Psychiatric:         Mood and Affect: Mood normal.         Behavior: Behavior normal.         Thought Content: Thought content normal.          Ultrasound:   PELVIC ULTRASOUND without DOPPLER INTERROGATION   NON OB    DATE: 12/07/2021    PHYSICIAN: RUBEN Nix President. OKiko SONOGRAPHER: [facility]    INDICATION: abnormal uterine bleeding    TYPE OF SCAN: vaginal    FINDINGS:    The cul de sac is normal. No free fluid appreciated. The cervix is normal and not enlarged. Nabothian cyst/s is not noted within the uterine cervix. The uterus measures 7.92 cm x 4.63 cm x 3.87 cm. The uterus is anteverted. The endometrium measures 4.88 mm. The myometrium is heterogeneous in appearance, suspected Adenomyosis. Posterior low Myoma noted measuring 1.1x1.2x1.1cm   No uterine anomalies are noted. The right ovary is present and normal.    The right ovary measures 2.76 cm x 2.11 cm x 2.28 cm. Ovary findings: No masses seen. The right adnexa is normal.    The left ovary is present and normal.    The left ovary measures 3.24 cm x 2.02 cm x 2.63 cm. Ovary findings: No masses seen. The left adnexa is normal.      IMPRESSION: Heterogenous uterus, suspect adenomyosis. 1.2cm posterior uterine fibroid. Normal right ovary. Normal left ovary. Imaging is limited secondary to bowel gas. The patient is well aware of the limitations of ultrasound in the detection of anomalies of the abdomen and pelvis. Assessment/Plan:     Sadaf Alex is a 29 y.o. female who presents for US evaluation of abnormal uterine bleeding. 1. Abnormal uterine bleeding (AUB)     - Patient is s/p endometrial ablation in 2019 with return of regular menses 3 months following ablation     - Reports moderate, painful bleeding, though shorter than before with occasional spotting 1 week prior or 1 week after menses     - Reviewed differential of bleeding with patient and options for management     - Patient has not tolerated OCPs or IUD in the past     - Infection screening was negative at last visit     - Pap smear NILM     - US today with findings of small posterior uterine fibroid and findings suggestive of adenomyosis.      - Risks, benefits and alternatives were reviewed with the patient including medical management and surgical intervention     - Patient declines addition of hormones at this time     - Patient is considering hysteroscopy D&C with evaluation for repeat ablation vs hysterectomy (VICK, MERVIN)     - Desires to discuss with her  and will call office     - Return precautions reviewed     - Will follow-up as needed     2.  Dysmenorrhea     - See above       Dallin Portillo,

## 2021-12-08 PROBLEM — N93.9 ABNORMAL UTERINE BLEEDING (AUB): Status: ACTIVE | Noted: 2021-12-08

## 2021-12-08 PROBLEM — N94.6 DYSMENORRHEA: Status: ACTIVE | Noted: 2021-12-08

## 2022-01-03 ENCOUNTER — TELEPHONE (OUTPATIENT)
Dept: OBGYN CLINIC | Age: 37
End: 2022-01-03

## 2022-01-04 NOTE — TELEPHONE ENCOUNTER
Called [employer] to see if a PA is needed for surgery, a PA is required started it today. Generally takes 14-21 days for a response. Will call and make patient aware.      Pending Auth number   [id number]    Faxed all clinical information to [phone]    Routing as a FYI

## 2022-01-26 ENCOUNTER — OFFICE VISIT (OUTPATIENT)
Dept: OBGYN CLINIC | Age: 37
End: 2022-01-26
Payer: COMMERCIAL

## 2022-01-26 VITALS
TEMPERATURE: 98.2 F | DIASTOLIC BLOOD PRESSURE: 80 MMHG | HEART RATE: 90 BPM | WEIGHT: 148.8 LBS | SYSTOLIC BLOOD PRESSURE: 104 MMHG | BODY MASS INDEX: 25.54 KG/M2

## 2022-01-26 DIAGNOSIS — Z32.02 NEGATIVE PREGNANCY TEST: ICD-10-CM

## 2022-01-26 DIAGNOSIS — N93.9 ABNORMAL UTERINE BLEEDING (AUB): Primary | ICD-10-CM

## 2022-01-26 LAB
CONTROL: NORMAL
PREGNANCY TEST URINE, POC: NEGATIVE

## 2022-01-26 PROCEDURE — 58100 BIOPSY OF UTERUS LINING: CPT | Performed by: OBSTETRICS & GYNECOLOGY

## 2022-01-26 PROCEDURE — 81025 URINE PREGNANCY TEST: CPT | Performed by: OBSTETRICS & GYNECOLOGY

## 2022-01-26 NOTE — PROGRESS NOTES
Endometrial Biopsy Procedure Note      Indication: Abnormal uterine bleeding    Urine pregnancy test: Negative    Consent:  The risks of the procedure were discussed with the patient. She was aware these risks include, but are not limited to bleeding, infection and damage to surrounding tissue. She elected to proceed with the endometrial biopsy. Written consent was obtained. Procedure Details: The patient was placed in the dorsal lithotomy position. A speculum was placed in the vagina, and the cervix was cleansed with betadine. A tenaculum was placed on the anterior lip of the cervix. The cervix was dilated. An endometrial pipelle was advanced to the fundus and a sample was gently collected. The uterus sounded to 7 cm. Specimen was adequate after 1 attempt(s). The tenaculum was removed and adequate hemostasis was noted. Patient tolerated the procedure well. EBL: Minimal     Complications: None    Plan:   Patient was instructed to take OTC motrin and/or tylenol for pain control  After care instructions discussed. Will follow up with results and status of prior authorization for upcoming surgical intervention.     Ruddy Koyanagi, DO

## 2022-02-14 NOTE — PROGRESS NOTES
Jocelyne Formosa    Age 29 y.o.    female    1985    MRN [id number]    3/31/2022  Arrival Time_____________  OR Time____________[address]     Procedure(s):  LAPAROSCOPIC ASSISTED VAGINAL HYSTERECTOMY, BILATERAL SALPINGECTOMY, POSSIBLE BILATERAL OOPHORECTOMY                      General     Surgeon(s):  Loli Holland, [facility], DO      DAY ADMIT ___  SDS/OP ___  OUTPT IN BED ___         Phone [phone] (home)    PCP _____________________ Phone_________________ Epic ( ) Epic CE ( ) Appt ________    ADDITIONAL INFO __________________________________ Cardio/Consult _____________    NOTES _____________________________________________________________________    ____________________________________________________________________________    PAT APPT DATE:________ TIME: ________  FAXED QAD: _______  (__) H&P w/ hospitalist  ____________________________________________________________________________    COVID TEST: Date/Location______________        NURSING HISTORY COMPLETE: _______  (__) CBC       (__) W/ DIFF ___________  (__)  ECHO    __________  (__) Hgb A1C    ___________  (__) CHEST X RAY   __________  (__) LIPID PROFILE  ___________  (__) EKG   __________  (__) PT/PTT   ___________  (__) PFT's   __________  (__) BMP   ___________  (__) CAROTIDS  __________  (__) CMP   ___________  (__) VEIN MAPPING  __________  (__) U/A   ___________  (__) HISTORY & PHYSICAL __________  (__) URINE C & S  ___________  (__) CARDIAC CLEARANCE __________  (__) U/A W/ FLEX  ___________  (__) PULM.  CLEARANCE __________  (__) SERUM PREGNANCY ___________  (__) Check Epic DOS orders __________  (__) TYPE & SCREEN ________ repeat ( ) (__)  __________________ __________  (__) ALBUMIN   ___________  (__)  __________________ __________  (__) TRANSFERRIN  ___________  (__)  __________________ __________  (__) LIVER PROFILE  ___________  (__)  __________________ __________  (__) CARBOXY HGB  ___________  (__) URINE PREG DOS __________  (__) NICOTINE & MET.  ___________  (__) BLOOD SUGAR DOS __________  (__) PREALBUMIN  ___________    (__) MRSA NASAL SWAB ___________  (__) BLOOD THINNERS __________  (__) ACE/ ARBS: _____________________    (__) BETABLOCKERS ___________________

## 2022-03-03 ENCOUNTER — TELEPHONE (OUTPATIENT)
Dept: OBGYN CLINIC | Age: 37
End: 2022-03-03

## 2022-03-03 NOTE — TELEPHONE ENCOUNTER
Received patients fmla forms via fax. Scanned copy into media. Placing on surgery schedulers desk to be completed.

## 2022-03-08 NOTE — TELEPHONE ENCOUNTER
Called left vm forms completed, patient still needs to sign before I can fax them.  Placed in patient  until her pre-op appt

## 2022-03-14 ENCOUNTER — OFFICE VISIT (OUTPATIENT)
Dept: FAMILY MEDICINE CLINIC | Age: 37
End: 2022-03-14
Payer: COMMERCIAL

## 2022-03-14 VITALS
DIASTOLIC BLOOD PRESSURE: 68 MMHG | OXYGEN SATURATION: 99 % | HEART RATE: 82 BPM | BODY MASS INDEX: 24.89 KG/M2 | SYSTOLIC BLOOD PRESSURE: 96 MMHG | WEIGHT: 145 LBS

## 2022-03-14 DIAGNOSIS — N93.9 ABNORMAL UTERINE BLEEDING (AUB): ICD-10-CM

## 2022-03-14 DIAGNOSIS — Z01.818 PRE-OP EXAMINATION: Primary | ICD-10-CM

## 2022-03-14 PROCEDURE — 99214 OFFICE O/P EST MOD 30 MIN: CPT | Performed by: STUDENT IN AN ORGANIZED HEALTH CARE EDUCATION/TRAINING PROGRAM

## 2022-03-14 SDOH — ECONOMIC STABILITY: FOOD INSECURITY: WITHIN THE PAST 12 MONTHS, YOU WORRIED THAT YOUR FOOD WOULD RUN OUT BEFORE YOU GOT MONEY TO BUY MORE.: NEVER TRUE

## 2022-03-14 SDOH — ECONOMIC STABILITY: HOUSING INSECURITY: IN THE LAST 12 MONTHS, HOW MANY PLACES HAVE YOU LIVED?: 1

## 2022-03-14 SDOH — ECONOMIC STABILITY: FOOD INSECURITY: WITHIN THE PAST 12 MONTHS, THE FOOD YOU BOUGHT JUST DIDN'T LAST AND YOU DIDN'T HAVE MONEY TO GET MORE.: NEVER TRUE

## 2022-03-14 SDOH — ECONOMIC STABILITY: HOUSING INSECURITY
IN THE LAST 12 MONTHS, WAS THERE A TIME WHEN YOU DID NOT HAVE A STEADY PLACE TO SLEEP OR SLEPT IN A SHELTER (INCLUDING NOW)?: NO

## 2022-03-14 SDOH — ECONOMIC STABILITY: INCOME INSECURITY: IN THE LAST 12 MONTHS, WAS THERE A TIME WHEN YOU WERE NOT ABLE TO PAY THE MORTGAGE OR RENT ON TIME?: NO

## 2022-03-14 SDOH — ECONOMIC STABILITY: TRANSPORTATION INSECURITY
IN THE PAST 12 MONTHS, HAS LACK OF TRANSPORTATION KEPT YOU FROM MEETINGS, WORK, OR FROM GETTING THINGS NEEDED FOR DAILY LIVING?: NO

## 2022-03-14 SDOH — ECONOMIC STABILITY: TRANSPORTATION INSECURITY
IN THE PAST 12 MONTHS, HAS THE LACK OF TRANSPORTATION KEPT YOU FROM MEDICAL APPOINTMENTS OR FROM GETTING MEDICATIONS?: NO

## 2022-03-14 ASSESSMENT — PATIENT HEALTH QUESTIONNAIRE - PHQ9
SUM OF ALL RESPONSES TO PHQ QUESTIONS 1-9: 0
SUM OF ALL RESPONSES TO PHQ QUESTIONS 1-9: 0
1. LITTLE INTEREST OR PLEASURE IN DOING THINGS: 0
2. FEELING DOWN, DEPRESSED OR HOPELESS: 0
SUM OF ALL RESPONSES TO PHQ9 QUESTIONS 1 & 2: 0
SUM OF ALL RESPONSES TO PHQ QUESTIONS 1-9: 0
SUM OF ALL RESPONSES TO PHQ QUESTIONS 1-9: 0

## 2022-03-14 ASSESSMENT — SOCIAL DETERMINANTS OF HEALTH (SDOH): HOW HARD IS IT FOR YOU TO PAY FOR THE VERY BASICS LIKE FOOD, HOUSING, MEDICAL CARE, AND HEATING?: NOT HARD AT ALL

## 2022-03-14 NOTE — PROGRESS NOTES
Preoperative Evaluation    Subjective:   Trupti Guardado is a 29 y.o. y.o.  female who presents to the office today for a preoperative consultation. Chief Complaint   Patient presents with    Pre-op Exam     Pietro Garcia is having a hysterectomy on 03/31/2022 with Dr. Mervin Reina at [facility] [place].       Planned anesthesia is General.   The patient has the following known anesthesia issues: none   Patient has a bleeding risk of : no recent abnormal bleeding   Patient does not have objection to receiving blood products if needed. Denies any steroid use in the past 6 mo    Review of Systems   Pertinent items are noted in HPI.      Denies fevers, chills, diaphoresis, CP, SOB, dysphagia, abd pain, urinary complaints, new edema, rashes, cyanosis    Past Medical History:   Diagnosis Date    Abnormal Pap smear of cervix     Herpes simplex virus (HSV) infection     History of HPV infection     Oligohydramnios, antepartum     Placental abruption     3 yrs ago    PONV (postoperative nausea and vomiting)        Past Surgical History:   Procedure Laterality Date    COLPOSCOPY      DILATION AND CURETTAGE      DILATION AND CURETTAGE OF UTERUS N/A 10/18/2019    VIDEO HYSTEROSCOPY DILATATION AND CURETTAGE, NOVASURE ABLATION performed by Saulo Rodríguez MD at [address] [place] [address]  10/18/2019    WISDOM TOOTH EXTRACTION      8 yrs ago       Social History     Tobacco History     Smoking Status  Never Smoker    Smokeless Tobacco Use  Never Used          Alcohol History     Alcohol Use Status  Yes Comment  6 drinks/week          Drug Use     Drug Use Status  No          Sexual Activity     Sexually Active  Yes Partners  Male                Family History   Problem Relation Age of Onset    High Blood Pressure Mother     Osteoporosis Mother     Other Mother         AVM    High Blood Pressure Father     Heart Disease Maternal Grandmother     Cancer Maternal Grandmother         skin    Alzheimer's Disease Paternal Grandmother     Other Maternal Grandfather         brain aneurysm    Colon Cancer Paternal Grandfather        Current Outpatient Medications   Medication Sig Dispense Refill    B Complex-Biotin-FA (MULTI-B COMPLEX PO) Take by mouth      BIOTIN MAXIMUM PO Take by mouth      VITAMIN D PO Take by mouth      Cyanocobalamin (VITAMIN B-12 ER PO) Take by mouth daily      phentermine (ADIPEX-P) 37.5 MG tablet Take 37.5 mg by mouth every morning (before breakfast).  valACYclovir (VALTREX) 500 MG tablet Take 500 mg by mouth as needed      ibuprofen (ADVIL;MOTRIN) 800 MG tablet Take 1 tablet by mouth every 6 hours as needed. 60 tablet 1     No current facility-administered medications for this visit. Objective:   Vitals:    03/14/22 1101   BP: 96/68   Pulse: 82   SpO2: 99%       Physical Exam   BP 96/68 (Site: Right Upper Arm, Position: Sitting, Cuff Size: Small Adult)   Pulse 82   Wt 145 lb (65.8 kg)   LMP 02/25/2022 (Exact Date)   SpO2 99%   Breastfeeding No   BMI 24.89 kg/m²   General appearance: alert, appears stated age, and cooperative  Throat: lips, mucosa, and tongue normal; teeth and gums normal  Lungs: clear to auscultation bilaterally  Heart: regular rate and rhythm, S1, S2 normal, no murmur, click, rub or gallop  Abdomen: soft, non-tender; bowel sounds normal; no masses,  no organomegaly  Extremities: extremities normal, atraumatic, no cyanosis or edema  Pulses: 2+ and symmetric  Skin: Skin color, texture, turgor normal. No rashes or lesions  Neurologic: Grossly normal     Predictors of intubation difficulty:   Anatomically abnormal facies? no   Short, thick neck? no   Neck range of motion: normal   Dentition: No chipped, loose, or missing teeth.      Lab Review   Office Visit on 01/26/2022   Component Date Value    Preg Test, Ur 01/26/2022 negative    Office Visit on 11/08/2021   Component Date Value    C. trachomatis DNA 11/08/2021 Negative     N. gonorrhoeae DNA 11/08/2021 Negative     Trichomonas Vaginalis DNA 11/08/2021                      Value:Negative DNA not detected. Normal range: Negative DNA not detected.  GARDNERELLA VAGINALIS, D* 11/08/2021                      Value:Negative DNA not detected. Normal range: Negative DNA not detected.  ROSSY SPECIES, DNA PRO* 11/08/2021                      Value:Negative DNA not detected. Normal range: Negative DNA not detected. Assessment:   29 y.o. female with planned surgery as above. - Known risk factors for perioperative complications: None   - Difficulty with intubation is not anticipated. - Current medications which may produce withdrawal symptoms if withheld perioperatively:      Plan:   1. Preoperative workup as follows CBC, BMP   2. Change in medication regimen before surgery: none, continue med regimen including morning of surgery, w/sip of water   Pt instructed to avoid NSAIDs, ASA, MV, Vitamin E, Fish oil 1 week prior to surgery to decrease bleeding risk. 3. Prophylaxis for cardiac events with perioperative beta-blockers: not indicated   4. Invasive hemodynamic monitoring perioperatively: not indicated   5. Deep vein thrombosis prophylaxis postoperatively:regimen to be chosen by surgical team   6. Other measures: none      1. Pre-op examination  - CBC; Future  - Basic Metabolic Panel; Future    2. Abnormal uterine bleeding (AUB)  - CBC; Future  - Basic Metabolic Panel; Future      While assessing care for this patient, I have reviewed all pertinent lab work/imaging/ specialist notes and care in reference to those problems addressed above in detail. Appropriate medical decision making was based on this. Please note that portions of this note may have been completed with a voice recognition program. Efforts were made to edit the dictations but occasionally words are mis-transcribed.       Pt is at an acceptable risk for planned procedure    Please call with any questions  Charu Montoya, DO

## 2022-03-16 ENCOUNTER — OFFICE VISIT (OUTPATIENT)
Dept: OBGYN CLINIC | Age: 37
End: 2022-03-16

## 2022-03-16 VITALS
DIASTOLIC BLOOD PRESSURE: 66 MMHG | BODY MASS INDEX: 25.23 KG/M2 | SYSTOLIC BLOOD PRESSURE: 110 MMHG | HEART RATE: 85 BPM | WEIGHT: 147 LBS | TEMPERATURE: 97.3 F

## 2022-03-16 DIAGNOSIS — N93.9 ABNORMAL UTERINE BLEEDING (AUB): Primary | ICD-10-CM

## 2022-03-16 DIAGNOSIS — N94.6 DYSMENORRHEA: ICD-10-CM

## 2022-03-16 DIAGNOSIS — D25.1 INTRAMURAL LEIOMYOMA OF UTERUS: ICD-10-CM

## 2022-03-16 PROCEDURE — 99024 POSTOP FOLLOW-UP VISIT: CPT | Performed by: OBSTETRICS & GYNECOLOGY

## 2022-03-16 NOTE — PROGRESS NOTES
INTERROGATION    NON OB       DATE: 12/07/2021       PHYSICIAN: RUBEN Balderas. Anders Berumen        SONOGRAPHER: Kim Clemons RDMS       INDICATION: abnormal uterine bleeding       TYPE OF SCAN: vaginal       FINDINGS:     The cul de sac is normal. No free fluid appreciated.       The cervix is normal and not enlarged. Nabothian cyst/s is not noted within the uterine cervix.       The uterus measures 7.92 cm x 4.63 cm x 3.87 cm.     The uterus is anteverted. The endometrium measures 4.88 mm. The myometrium is heterogeneous in appearance, suspected Adenomyosis. Posterior low Myoma noted measuring 1.1x1.2x1.1cm    No uterine anomalies are noted.        The right ovary is present and normal.     The right ovary measures 2.76 cm x 2.11 cm x 2.28 cm.     Ovary findings: No masses seen. The right adnexa is normal.       The left ovary is present and normal.     The left ovary measures 3.24 cm x 2.02 cm x 2.63 cm.     Ovary findings: No masses seen. The left adnexa is normal.           IMPRESSION: Heterogenous uterus, suspect adenomyosis.  1.2cm posterior uterine fibroid.  Normal right ovary. Normal left ovary. Imaging is limited secondary to bowel gas. The patient is well aware of the limitations of ultrasound in the detection of anomalies of the abdomen and pelvis. Pathology:   Pap Smear  GENERAL CATEGORIZATION:   Negative for Intraepithelial Lesion or Malignancy     SPECIMEN ADEQUACY:   Satisfactory for Evaluation.  No endocervical cells/transformation zone   component present. EMB:   FINAL DIAGNOSIS:     Endometrium, curettage:      - Minute superficial strips of endometrium with background blood     COMMENT: Rhetta Aysha is no evidence of hyperplasia, atypia, or malignancy.     KIRSE/KIRSE     Assessment/Plan:     Alysia Hallman is a 29 y.o. female who presents to sign consents for upcoming LAVH, BS    1.  Abnormal uterine bleeding (AUB)     - Patient is s/p endometrial ablation in 2019 with return of regular menses 3 months following ablation     - Reports moderate, painful bleeding, though shorter than before with occasional spotting 1 week prior or 1 week after menses     - Reviewed differential of bleeding with patient and options for management     - Patient has not tolerated OCPs or IUD in the past     - Infection screening was negative      - US with small posterior uterine fibroid and findings suggestive of adenomyosis. - Benign EMB     - Pap smear NILM     - Risks, benefits and alternatives were reviewed with the patient including medical management and surgical intervention     - Patient declines addition of hormones at this time     - Patient desires to proceed with laparoscopic assisted vaginal hysterectomy with bilateral salpingectomy. - All questions were answered to the patient's satisfaction     - Consents are signed and in chart     - Pre-op instructions reviewed     - Post-op instructions reviewed     - Has had H&P with PCP and plans for pre-op labs next week     - Plan for Motrin and Percocet at discharge     - Will follow-up with procedure as scheduleed     2. Dysmenorrhea     - See above     3. Uterine fibroid      - See above    The patient was counseled at length about the risks of jadon Covid-19 during their perioperative period and any recovery window from their procedure. The patient was made aware that jadon Covid-19  may worsen their prognosis for recovering from their procedure  and lend to a higher morbidity and/or mortality risk. All material risks, benefits, and reasonable alternatives including postponing the procedure were discussed. The patient does wish to proceed with the procedure at this time.        Corrinne Lies, DO

## 2022-03-17 ENCOUNTER — TELEPHONE (OUTPATIENT)
Dept: OBGYN CLINIC | Age: 37
End: 2022-03-17

## 2022-03-17 NOTE — TELEPHONE ENCOUNTER
Pt calling to ask that [facility] paperwork br revised to reflect 6 weeks instead of 4 so that if extra time is needed it will not be a hassle . Please advise. Routing to Surgery scheduler.

## 2022-03-17 NOTE — TELEPHONE ENCOUNTER
Discussed with patient at office visit yesterday and advised okay for 6 weeks. Please update paperwork. Thank you.

## 2022-03-24 ENCOUNTER — HOSPITAL ENCOUNTER (OUTPATIENT)
Age: 37
Discharge: HOME | End: 2022-03-24
Payer: COMMERCIAL

## 2022-03-24 DIAGNOSIS — Z01.818 PRE-OP EXAMINATION: ICD-10-CM

## 2022-03-24 DIAGNOSIS — N93.9 ABNORMAL UTERINE BLEEDING (AUB): ICD-10-CM

## 2022-03-24 PROBLEM — D25.1 INTRAMURAL LEIOMYOMA OF UTERUS: Status: ACTIVE | Noted: 2022-03-24

## 2022-03-24 LAB
ANION GAP SERPL CALCULATED.3IONS-SCNC: 13 MMOL/L (ref 3–16)
BUN BLDV-MCNC: 10 MG/DL (ref 7–20)
CALCIUM SERPL-MCNC: 9.4 MG/DL (ref 8.3–10.6)
CHLORIDE BLD-SCNC: 100 MMOL/L (ref 99–110)
CO2: 23 MMOL/L (ref 21–32)
CREAT SERPL-MCNC: 0.7 MG/DL (ref 0.6–1.1)
GFR AFRICAN AMERICAN: >60
GFR NON-AFRICAN AMERICAN: >60
GLUCOSE BLD-MCNC: 85 MG/DL (ref 70–99)
HCT VFR BLD CALC: 42.3 % (ref 36–48)
HEMOGLOBIN: 14.2 G/DL (ref 12–16)
MCH RBC QN AUTO: 31.1 PG (ref 26–34)
MCHC RBC AUTO-ENTMCNC: 33.7 G/DL (ref 31–36)
MCV RBC AUTO: 92.4 FL (ref 80–100)
PDW BLD-RTO: 13 % (ref 12.4–15.4)
PLATELET # BLD: 311 K/UL (ref 135–450)
PMV BLD AUTO: 9.2 FL (ref 5–10.5)
POTASSIUM SERPL-SCNC: 4.4 MMOL/L (ref 3.5–5.1)
RBC # BLD: 4.57 M/UL (ref 4–5.2)
SODIUM BLD-SCNC: 136 MMOL/L (ref 136–145)
WBC # BLD: 9.7 K/UL (ref 4–11)

## 2022-03-24 PROCEDURE — 36415 COLL VENOUS BLD VENIPUNCTURE: CPT

## 2022-03-24 PROCEDURE — 85027 COMPLETE CBC AUTOMATED: CPT

## 2022-03-24 PROCEDURE — 80048 BASIC METABOLIC PNL TOTAL CA: CPT

## 2022-03-24 RX ORDER — MULTIVIT WITH MINERALS/LUTEIN
250 TABLET ORAL DAILY
COMMUNITY

## 2022-03-24 NOTE — PROGRESS NOTES
1. Do not eat or drink anything after 12 midnight prior to surgery. This includes no water, chewing gum mints, or ice chips. You may brush your teeth and gargle the day of surgery but DO NOT SWALLOW THE WATER. 2. Please see your family doctor/pediatrician for a history and physical and/or concerning medications. Bring any test results/reports from your physician's office. If you are under the care of a heart doctor or specialist please be aware that you may be asked to see him or her for clearance. 3. You may be asked to stop blood thinners such as Coumadin, Plavix, Fragmin, and Lovenox or Anti-inflammatories such as Aspirin, Ibuprofen, Advil, and Naproxen prior to your surgery. Please check with your doctor before stopping these or any other medications. 4. Do not smoke, and do not drink any alcoholic beverages 24 hours prior to surgery. 5. You MUST make arrangements for a responsible adult to take you home after your surgery. For your safety, you will not be allowed to leave alone or drive yourself home. Your surgery will be cancelled if you do not have a ride home. Also for your safety, it is strongly suggested someone stay with you the first 24 hrs after your surgery. 6. A parent/legal guardian must accompany a child scheduled for surgery and plan to stay at the hospital until the child is discharged. Please do not bring other children with you. 7. For your comfort,please wear simple, loose fitting clothing to the hospital.  Please do not bring valuables (money, credit cards, checkbooks, etc.) Do not wear any makeup (including no eye makeup) or nail polish on your fingers or toes. 8. For your safety, please DO NOT wear any jewelry or piercings on day of surgery. All body piercing jewelry must be removed. 9. If you have dentures, they will be removed before going to the OR; for your convenience we will provide you with a container.   If you wear contact lenses or glasses, they will be removed, they will be removed, please bring a case for them. 10. If appicable,Please see your family doctor/pediatrician for a history & physical and/or concerning medications. Bring any test results/reports from your physician's office. 11. Remember to bring Blood Bank bracelet to the hospital on the day of surgery. 12. If you have a Living Will and Durable Power of  for Healthcare, please bring in a copy. 15. Notify your Surgeon if you develop any illness between now and surgery  time, cough, cold, fever, sore throat, nausea, vomiting, etc.  Please notify your surgeon if you experience dizziness, shortness of breath or blurred vision between now & the time of your surgery   14. DO NOT shave your operative site 96 hours prior to surgery. For face & neck surgery, men may use an electric razor 48 hours prior to surgery. 15. Shower the night before surgery with _X__Antibacterial soap ___Hibiclens   16. To provide excellent care visitors will be limited to one in the room at any given time. 17.  Please bring picture ID and insurance card. 18.  Visit our web site for additional information:  [email or web address].           INSTRUCTED TO STOP PHENTERMINE FOR SURGERY - LAST DOSE 3/24/22 AM

## 2022-03-25 ENCOUNTER — ANESTHESIA EVENT (OUTPATIENT)
Dept: OPERATING ROOM | Age: 37
DRG: 743 | End: 2022-03-25
Payer: COMMERCIAL

## 2022-03-31 ENCOUNTER — HOSPITAL ENCOUNTER (INPATIENT)
Age: 37
LOS: 1 days | Discharge: HOME | DRG: 743 | End: 2022-04-01
Attending: OBSTETRICS & GYNECOLOGY | Admitting: OBSTETRICS & GYNECOLOGY
Payer: COMMERCIAL

## 2022-03-31 ENCOUNTER — ANESTHESIA (OUTPATIENT)
Dept: OPERATING ROOM | Age: 37
DRG: 743 | End: 2022-03-31
Payer: COMMERCIAL

## 2022-03-31 VITALS
OXYGEN SATURATION: 95 % | RESPIRATION RATE: 9 BRPM | SYSTOLIC BLOOD PRESSURE: 91 MMHG | DIASTOLIC BLOOD PRESSURE: 52 MMHG | TEMPERATURE: 98.2 F

## 2022-03-31 DIAGNOSIS — N93.9 ABNORMAL UTERINE BLEEDING (AUB): ICD-10-CM

## 2022-03-31 DIAGNOSIS — N94.6 DYSMENORRHEA: ICD-10-CM

## 2022-03-31 DIAGNOSIS — Z90.710 S/P LAPAROSCOPIC ASSISTED VAGINAL HYSTERECTOMY (LAVH): Primary | ICD-10-CM

## 2022-03-31 LAB
ABO/RH: NORMAL
ANTIBODY SCREEN: NORMAL
HCT VFR BLD CALC: 35.2 % (ref 36–48)
HCT VFR BLD CALC: 35.5 % (ref 36–48)
HEMOGLOBIN: 11.8 G/DL (ref 12–16)
HEMOGLOBIN: 11.8 G/DL (ref 12–16)
PREGNANCY, URINE: NEGATIVE

## 2022-03-31 PROCEDURE — 2500000003 HC RX 250 WO HCPCS: Performed by: OBSTETRICS & GYNECOLOGY

## 2022-03-31 PROCEDURE — 2709999900 HC NON-CHARGEABLE SUPPLY: Performed by: OBSTETRICS & GYNECOLOGY

## 2022-03-31 PROCEDURE — 2500000003 HC RX 250 WO HCPCS: Performed by: ANESTHESIOLOGY

## 2022-03-31 PROCEDURE — 6360000002 HC RX W HCPCS: Performed by: NURSE ANESTHETIST, CERTIFIED REGISTERED

## 2022-03-31 PROCEDURE — 85018 HEMOGLOBIN: CPT

## 2022-03-31 PROCEDURE — 3700000001 HC ADD 15 MINUTES (ANESTHESIA): Performed by: OBSTETRICS & GYNECOLOGY

## 2022-03-31 PROCEDURE — 2580000003 HC RX 258: Performed by: ANESTHESIOLOGY

## 2022-03-31 PROCEDURE — 96375 TX/PRO/DX INJ NEW DRUG ADDON: CPT

## 2022-03-31 PROCEDURE — 2580000003 HC RX 258: Performed by: OBSTETRICS & GYNECOLOGY

## 2022-03-31 PROCEDURE — 6360000002 HC RX W HCPCS: Performed by: ANESTHESIOLOGY

## 2022-03-31 PROCEDURE — 85014 HEMATOCRIT: CPT

## 2022-03-31 PROCEDURE — 58552 LAPARO-VAG HYST INCL T/O: CPT | Performed by: OBSTETRICS & GYNECOLOGY

## 2022-03-31 PROCEDURE — 3700000000 HC ANESTHESIA ATTENDED CARE: Performed by: OBSTETRICS & GYNECOLOGY

## 2022-03-31 PROCEDURE — 0UT9FZZ RESECTION OF UTERUS, VIA NATURAL OR ARTIFICIAL OPENING WITH PERCUTANEOUS ENDOSCOPIC ASSISTANCE: ICD-10-PCS | Performed by: OBSTETRICS & GYNECOLOGY

## 2022-03-31 PROCEDURE — 86850 RBC ANTIBODY SCREEN: CPT

## 2022-03-31 PROCEDURE — 2500000003 HC RX 250 WO HCPCS: Performed by: NURSE ANESTHETIST, CERTIFIED REGISTERED

## 2022-03-31 PROCEDURE — 6360000002 HC RX W HCPCS: Performed by: OBSTETRICS & GYNECOLOGY

## 2022-03-31 PROCEDURE — 1200000000 HC SEMI PRIVATE

## 2022-03-31 PROCEDURE — 7100000001 HC PACU RECOVERY - ADDTL 15 MIN: Performed by: OBSTETRICS & GYNECOLOGY

## 2022-03-31 PROCEDURE — 86901 BLOOD TYPING SEROLOGIC RH(D): CPT

## 2022-03-31 PROCEDURE — 0UT7FZZ RESECTION OF BILATERAL FALLOPIAN TUBES, VIA NATURAL OR ARTIFICIAL OPENING WITH PERCUTANEOUS ENDOSCOPIC ASSISTANCE: ICD-10-PCS | Performed by: OBSTETRICS & GYNECOLOGY

## 2022-03-31 PROCEDURE — 6370000000 HC RX 637 (ALT 250 FOR IP): Performed by: OBSTETRICS & GYNECOLOGY

## 2022-03-31 PROCEDURE — 3600000014 HC SURGERY LEVEL 4 ADDTL 15MIN: Performed by: OBSTETRICS & GYNECOLOGY

## 2022-03-31 PROCEDURE — 96361 HYDRATE IV INFUSION ADD-ON: CPT

## 2022-03-31 PROCEDURE — 84703 CHORIONIC GONADOTROPIN ASSAY: CPT

## 2022-03-31 PROCEDURE — 88307 TISSUE EXAM BY PATHOLOGIST: CPT

## 2022-03-31 PROCEDURE — 96376 TX/PRO/DX INJ SAME DRUG ADON: CPT

## 2022-03-31 PROCEDURE — A4217 STERILE WATER/SALINE, 500 ML: HCPCS | Performed by: OBSTETRICS & GYNECOLOGY

## 2022-03-31 PROCEDURE — 36415 COLL VENOUS BLD VENIPUNCTURE: CPT

## 2022-03-31 PROCEDURE — 96374 THER/PROPH/DIAG INJ IV PUSH: CPT

## 2022-03-31 PROCEDURE — G0378 HOSPITAL OBSERVATION PER HR: HCPCS

## 2022-03-31 PROCEDURE — 2720000010 HC SURG SUPPLY STERILE: Performed by: OBSTETRICS & GYNECOLOGY

## 2022-03-31 PROCEDURE — 3600000004 HC SURGERY LEVEL 4 BASE: Performed by: OBSTETRICS & GYNECOLOGY

## 2022-03-31 PROCEDURE — 86900 BLOOD TYPING SEROLOGIC ABO: CPT

## 2022-03-31 PROCEDURE — 7100000000 HC PACU RECOVERY - FIRST 15 MIN: Performed by: OBSTETRICS & GYNECOLOGY

## 2022-03-31 RX ORDER — BISACODYL 10 MG
10 SUPPOSITORY, RECTAL RECTAL DAILY PRN
Status: DISCONTINUED | OUTPATIENT
Start: 2022-03-31 | End: 2022-04-01 | Stop reason: HOSPADM

## 2022-03-31 RX ORDER — DIPHENHYDRAMINE HYDROCHLORIDE 50 MG/ML
INJECTION INTRAMUSCULAR; INTRAVENOUS PRN
Status: DISCONTINUED | OUTPATIENT
Start: 2022-03-31 | End: 2022-03-31 | Stop reason: SDUPTHER

## 2022-03-31 RX ORDER — MIDAZOLAM HYDROCHLORIDE 1 MG/ML
INJECTION INTRAMUSCULAR; INTRAVENOUS PRN
Status: DISCONTINUED | OUTPATIENT
Start: 2022-03-31 | End: 2022-03-31 | Stop reason: SDUPTHER

## 2022-03-31 RX ORDER — SODIUM CHLORIDE, SODIUM LACTATE, POTASSIUM CHLORIDE, CALCIUM CHLORIDE 600; 310; 30; 20 MG/100ML; MG/100ML; MG/100ML; MG/100ML
INJECTION, SOLUTION INTRAVENOUS CONTINUOUS
Status: DISCONTINUED | OUTPATIENT
Start: 2022-03-31 | End: 2022-03-31

## 2022-03-31 RX ORDER — SODIUM CHLORIDE 0.9 % (FLUSH) 0.9 %
10 SYRINGE (ML) INJECTION EVERY 12 HOURS SCHEDULED
Status: DISCONTINUED | OUTPATIENT
Start: 2022-03-31 | End: 2022-03-31 | Stop reason: HOSPADM

## 2022-03-31 RX ORDER — HYDROMORPHONE HCL 110MG/55ML
1 PATIENT CONTROLLED ANALGESIA SYRINGE INTRAVENOUS
Status: DISCONTINUED | OUTPATIENT
Start: 2022-03-31 | End: 2022-04-01 | Stop reason: HOSPADM

## 2022-03-31 RX ORDER — ONDANSETRON 4 MG/1
4 TABLET, ORALLY DISINTEGRATING ORAL EVERY 8 HOURS PRN
Status: DISCONTINUED | OUTPATIENT
Start: 2022-03-31 | End: 2022-04-01 | Stop reason: HOSPADM

## 2022-03-31 RX ORDER — BUPIVACAINE HYDROCHLORIDE AND EPINEPHRINE 5; 5 MG/ML; UG/ML
INJECTION, SOLUTION PERINEURAL PRN
Status: DISCONTINUED | OUTPATIENT
Start: 2022-03-31 | End: 2022-03-31 | Stop reason: HOSPADM

## 2022-03-31 RX ORDER — SODIUM CHLORIDE 9 MG/ML
25 INJECTION, SOLUTION INTRAVENOUS PRN
Status: DISCONTINUED | OUTPATIENT
Start: 2022-03-31 | End: 2022-03-31 | Stop reason: HOSPADM

## 2022-03-31 RX ORDER — OXYCODONE HYDROCHLORIDE 5 MG/1
5 TABLET ORAL PRN
Status: DISCONTINUED | OUTPATIENT
Start: 2022-03-31 | End: 2022-03-31 | Stop reason: HOSPADM

## 2022-03-31 RX ORDER — FENTANYL CITRATE 50 UG/ML
INJECTION, SOLUTION INTRAMUSCULAR; INTRAVENOUS PRN
Status: DISCONTINUED | OUTPATIENT
Start: 2022-03-31 | End: 2022-03-31 | Stop reason: SDUPTHER

## 2022-03-31 RX ORDER — KETOROLAC TROMETHAMINE 30 MG/ML
30 INJECTION, SOLUTION INTRAMUSCULAR; INTRAVENOUS ONCE
Status: DISCONTINUED | OUTPATIENT
Start: 2022-03-31 | End: 2022-03-31

## 2022-03-31 RX ORDER — POLYETHYLENE GLYCOL 3350 17 G/17G
17 POWDER, FOR SOLUTION ORAL DAILY PRN
Status: DISCONTINUED | OUTPATIENT
Start: 2022-03-31 | End: 2022-04-01 | Stop reason: HOSPADM

## 2022-03-31 RX ORDER — KETOROLAC TROMETHAMINE 30 MG/ML
15 INJECTION, SOLUTION INTRAMUSCULAR; INTRAVENOUS ONCE
Status: COMPLETED | OUTPATIENT
Start: 2022-03-31 | End: 2022-03-31

## 2022-03-31 RX ORDER — SODIUM CHLORIDE, SODIUM LACTATE, POTASSIUM CHLORIDE, CALCIUM CHLORIDE 600; 310; 30; 20 MG/100ML; MG/100ML; MG/100ML; MG/100ML
INJECTION, SOLUTION INTRAVENOUS CONTINUOUS
Status: DISCONTINUED | OUTPATIENT
Start: 2022-03-31 | End: 2022-04-01 | Stop reason: HOSPADM

## 2022-03-31 RX ORDER — SODIUM CHLORIDE 9 MG/ML
25 INJECTION, SOLUTION INTRAVENOUS PRN
Status: DISCONTINUED | OUTPATIENT
Start: 2022-03-31 | End: 2022-04-01 | Stop reason: HOSPADM

## 2022-03-31 RX ORDER — OXYCODONE HYDROCHLORIDE 5 MG/1
10 TABLET ORAL PRN
Status: DISCONTINUED | OUTPATIENT
Start: 2022-03-31 | End: 2022-03-31 | Stop reason: HOSPADM

## 2022-03-31 RX ORDER — HYDROMORPHONE HCL 110MG/55ML
0.5 PATIENT CONTROLLED ANALGESIA SYRINGE INTRAVENOUS
Status: DISCONTINUED | OUTPATIENT
Start: 2022-03-31 | End: 2022-04-01 | Stop reason: HOSPADM

## 2022-03-31 RX ORDER — KETOROLAC TROMETHAMINE 30 MG/ML
30 INJECTION, SOLUTION INTRAMUSCULAR; INTRAVENOUS EVERY 6 HOURS
Status: DISCONTINUED | OUTPATIENT
Start: 2022-03-31 | End: 2022-04-01 | Stop reason: HOSPADM

## 2022-03-31 RX ORDER — MEPERIDINE HYDROCHLORIDE 50 MG/ML
12.5 INJECTION INTRAMUSCULAR; INTRAVENOUS; SUBCUTANEOUS EVERY 5 MIN PRN
Status: DISCONTINUED | OUTPATIENT
Start: 2022-03-31 | End: 2022-03-31 | Stop reason: HOSPADM

## 2022-03-31 RX ORDER — ACETAMINOPHEN 325 MG/1
650 TABLET ORAL EVERY 4 HOURS PRN
Status: DISCONTINUED | OUTPATIENT
Start: 2022-03-31 | End: 2022-04-01 | Stop reason: HOSPADM

## 2022-03-31 RX ORDER — LIDOCAINE HYDROCHLORIDE 20 MG/ML
INJECTION, SOLUTION INFILTRATION; PERINEURAL PRN
Status: DISCONTINUED | OUTPATIENT
Start: 2022-03-31 | End: 2022-03-31 | Stop reason: SDUPTHER

## 2022-03-31 RX ORDER — PHENYLEPHRINE HCL IN 0.9% NACL 1 MG/10 ML
SYRINGE (ML) INTRAVENOUS PRN
Status: DISCONTINUED | OUTPATIENT
Start: 2022-03-31 | End: 2022-03-31 | Stop reason: SDUPTHER

## 2022-03-31 RX ORDER — ROCURONIUM BROMIDE 10 MG/ML
INJECTION, SOLUTION INTRAVENOUS PRN
Status: DISCONTINUED | OUTPATIENT
Start: 2022-03-31 | End: 2022-03-31 | Stop reason: SDUPTHER

## 2022-03-31 RX ORDER — OXYCODONE HYDROCHLORIDE AND ACETAMINOPHEN 5; 325 MG/1; MG/1
2 TABLET ORAL EVERY 6 HOURS PRN
Status: DISCONTINUED | OUTPATIENT
Start: 2022-03-31 | End: 2022-04-01 | Stop reason: HOSPADM

## 2022-03-31 RX ORDER — SODIUM CHLORIDE 0.9 % (FLUSH) 0.9 %
5-40 SYRINGE (ML) INJECTION EVERY 12 HOURS SCHEDULED
Status: DISCONTINUED | OUTPATIENT
Start: 2022-03-31 | End: 2022-04-01 | Stop reason: HOSPADM

## 2022-03-31 RX ORDER — HYDROXYZINE PAMOATE 25 MG/1
25 CAPSULE ORAL EVERY EVENING
Status: DISCONTINUED | OUTPATIENT
Start: 2022-03-31 | End: 2022-04-01 | Stop reason: HOSPADM

## 2022-03-31 RX ORDER — KETOROLAC TROMETHAMINE 30 MG/ML
INJECTION, SOLUTION INTRAMUSCULAR; INTRAVENOUS PRN
Status: DISCONTINUED | OUTPATIENT
Start: 2022-03-31 | End: 2022-03-31

## 2022-03-31 RX ORDER — APREPITANT 40 MG/1
40 CAPSULE ORAL ONCE
Status: COMPLETED | OUTPATIENT
Start: 2022-03-31 | End: 2022-03-31

## 2022-03-31 RX ORDER — SODIUM CHLORIDE 0.9 % (FLUSH) 0.9 %
10 SYRINGE (ML) INJECTION PRN
Status: DISCONTINUED | OUTPATIENT
Start: 2022-03-31 | End: 2022-03-31 | Stop reason: HOSPADM

## 2022-03-31 RX ORDER — ONDANSETRON 2 MG/ML
INJECTION INTRAMUSCULAR; INTRAVENOUS PRN
Status: DISCONTINUED | OUTPATIENT
Start: 2022-03-31 | End: 2022-03-31 | Stop reason: SDUPTHER

## 2022-03-31 RX ORDER — DEXAMETHASONE SODIUM PHOSPHATE 10 MG/ML
INJECTION INTRAMUSCULAR; INTRAVENOUS PRN
Status: DISCONTINUED | OUTPATIENT
Start: 2022-03-31 | End: 2022-03-31 | Stop reason: SDUPTHER

## 2022-03-31 RX ORDER — LIDOCAINE HYDROCHLORIDE 10 MG/ML
1 INJECTION, SOLUTION EPIDURAL; INFILTRATION; INTRACAUDAL; PERINEURAL
Status: DISCONTINUED | OUTPATIENT
Start: 2022-03-31 | End: 2022-03-31 | Stop reason: HOSPADM

## 2022-03-31 RX ORDER — ONDANSETRON 2 MG/ML
4 INJECTION INTRAMUSCULAR; INTRAVENOUS EVERY 6 HOURS PRN
Status: DISCONTINUED | OUTPATIENT
Start: 2022-03-31 | End: 2022-04-01 | Stop reason: HOSPADM

## 2022-03-31 RX ORDER — ONDANSETRON 2 MG/ML
4 INJECTION INTRAMUSCULAR; INTRAVENOUS
Status: DISCONTINUED | OUTPATIENT
Start: 2022-03-31 | End: 2022-03-31 | Stop reason: HOSPADM

## 2022-03-31 RX ORDER — OXYCODONE HYDROCHLORIDE AND ACETAMINOPHEN 5; 325 MG/1; MG/1
1 TABLET ORAL EVERY 6 HOURS PRN
Status: DISCONTINUED | OUTPATIENT
Start: 2022-03-31 | End: 2022-04-01 | Stop reason: HOSPADM

## 2022-03-31 RX ORDER — PROPOFOL 10 MG/ML
INJECTION, EMULSION INTRAVENOUS PRN
Status: DISCONTINUED | OUTPATIENT
Start: 2022-03-31 | End: 2022-03-31 | Stop reason: SDUPTHER

## 2022-03-31 RX ORDER — SODIUM CHLORIDE 0.9 % (FLUSH) 0.9 %
5-40 SYRINGE (ML) INJECTION PRN
Status: DISCONTINUED | OUTPATIENT
Start: 2022-03-31 | End: 2022-04-01 | Stop reason: HOSPADM

## 2022-03-31 RX ORDER — SODIUM CHLORIDE 0.9 % (FLUSH) 0.9 %
5-40 SYRINGE (ML) INJECTION EVERY 12 HOURS SCHEDULED
Status: DISCONTINUED | OUTPATIENT
Start: 2022-03-31 | End: 2022-03-31 | Stop reason: HOSPADM

## 2022-03-31 RX ORDER — SODIUM CHLORIDE 0.9 % (FLUSH) 0.9 %
5-40 SYRINGE (ML) INJECTION PRN
Status: DISCONTINUED | OUTPATIENT
Start: 2022-03-31 | End: 2022-03-31 | Stop reason: HOSPADM

## 2022-03-31 RX ORDER — SODIUM CHLORIDE, SODIUM LACTATE, POTASSIUM CHLORIDE, AND CALCIUM CHLORIDE .6; .31; .03; .02 G/100ML; G/100ML; G/100ML; G/100ML
IRRIGANT IRRIGATION PRN
Status: DISCONTINUED | OUTPATIENT
Start: 2022-03-31 | End: 2022-03-31 | Stop reason: HOSPADM

## 2022-03-31 RX ORDER — MAGNESIUM HYDROXIDE 1200 MG/15ML
LIQUID ORAL CONTINUOUS PRN
Status: COMPLETED | OUTPATIENT
Start: 2022-03-31 | End: 2022-03-31

## 2022-03-31 RX ADMIN — PROPOFOL 200 MG: 10 INJECTION, EMULSION INTRAVENOUS at 07:35

## 2022-03-31 RX ADMIN — Medication 100 MCG: at 08:03

## 2022-03-31 RX ADMIN — HYDROXYZINE PAMOATE 25 MG: 25 CAPSULE ORAL at 20:12

## 2022-03-31 RX ADMIN — HYDROMORPHONE HYDROCHLORIDE 1 MG: 2 INJECTION, SOLUTION INTRAMUSCULAR; INTRAVENOUS; SUBCUTANEOUS at 19:00

## 2022-03-31 RX ADMIN — KETOROLAC TROMETHAMINE 15 MG: 30 INJECTION, SOLUTION INTRAMUSCULAR; INTRAVENOUS at 10:29

## 2022-03-31 RX ADMIN — MIDAZOLAM HYDROCHLORIDE 2 MG: 2 INJECTION, SOLUTION INTRAMUSCULAR; INTRAVENOUS at 07:30

## 2022-03-31 RX ADMIN — APREPITANT 40 MG: 40 CAPSULE ORAL at 06:45

## 2022-03-31 RX ADMIN — HYDROMORPHONE HYDROCHLORIDE 0.5 MG: 1 INJECTION, SOLUTION INTRAMUSCULAR; INTRAVENOUS; SUBCUTANEOUS at 11:09

## 2022-03-31 RX ADMIN — SODIUM CHLORIDE, POTASSIUM CHLORIDE, SODIUM LACTATE AND CALCIUM CHLORIDE: 600; 310; 30; 20 INJECTION, SOLUTION INTRAVENOUS at 13:13

## 2022-03-31 RX ADMIN — ROCURONIUM BROMIDE 50 MG: 10 SOLUTION INTRAVENOUS at 07:35

## 2022-03-31 RX ADMIN — Medication 10 ML: at 20:12

## 2022-03-31 RX ADMIN — Medication 100 MCG: at 08:00

## 2022-03-31 RX ADMIN — ROCURONIUM BROMIDE 20 MG: 10 SOLUTION INTRAVENOUS at 08:00

## 2022-03-31 RX ADMIN — KETOROLAC TROMETHAMINE 30 MG: 30 INJECTION, SOLUTION INTRAMUSCULAR; INTRAVENOUS at 20:10

## 2022-03-31 RX ADMIN — DEXAMETHASONE SODIUM PHOSPHATE 10 MG: 10 INJECTION INTRAMUSCULAR; INTRAVENOUS at 07:42

## 2022-03-31 RX ADMIN — FENTANYL CITRATE 100 MCG: 50 INJECTION INTRAMUSCULAR; INTRAVENOUS at 07:35

## 2022-03-31 RX ADMIN — DIPHENHYDRAMINE HYDROCHLORIDE 6.25 MG: 50 INJECTION, SOLUTION INTRAMUSCULAR; INTRAVENOUS at 09:09

## 2022-03-31 RX ADMIN — SUGAMMADEX 200 MG: 100 INJECTION, SOLUTION INTRAVENOUS at 09:36

## 2022-03-31 RX ADMIN — SODIUM CHLORIDE, POTASSIUM CHLORIDE, SODIUM LACTATE AND CALCIUM CHLORIDE: 600; 310; 30; 20 INJECTION, SOLUTION INTRAVENOUS at 06:47

## 2022-03-31 RX ADMIN — SODIUM CHLORIDE, POTASSIUM CHLORIDE, SODIUM LACTATE AND CALCIUM CHLORIDE: 600; 310; 30; 20 INJECTION, SOLUTION INTRAVENOUS at 20:15

## 2022-03-31 RX ADMIN — LIDOCAINE HYDROCHLORIDE 100 MG: 20 INJECTION, SOLUTION INFILTRATION; PERINEURAL at 07:35

## 2022-03-31 RX ADMIN — FAMOTIDINE 20 MG: 10 INJECTION, SOLUTION INTRAVENOUS at 06:45

## 2022-03-31 RX ADMIN — CEFAZOLIN 2000 MG: 10 INJECTION, POWDER, FOR SOLUTION INTRAVENOUS at 07:35

## 2022-03-31 RX ADMIN — KETOROLAC TROMETHAMINE 30 MG: 30 INJECTION, SOLUTION INTRAMUSCULAR; INTRAVENOUS at 14:50

## 2022-03-31 RX ADMIN — ONDANSETRON 4 MG: 2 INJECTION INTRAMUSCULAR; INTRAVENOUS at 07:42

## 2022-03-31 RX ADMIN — SODIUM CHLORIDE, POTASSIUM CHLORIDE, SODIUM LACTATE AND CALCIUM CHLORIDE: 600; 310; 30; 20 INJECTION, SOLUTION INTRAVENOUS at 08:25

## 2022-03-31 ASSESSMENT — PULMONARY FUNCTION TESTS
PIF_VALUE: 21
PIF_VALUE: 25
PIF_VALUE: 23
PIF_VALUE: 14
PIF_VALUE: 16
PIF_VALUE: 22
PIF_VALUE: 15
PIF_VALUE: 22
PIF_VALUE: 22
PIF_VALUE: 21
PIF_VALUE: 16
PIF_VALUE: 15
PIF_VALUE: 17
PIF_VALUE: 22
PIF_VALUE: 15
PIF_VALUE: 16
PIF_VALUE: 17
PIF_VALUE: 16
PIF_VALUE: 14
PIF_VALUE: 16
PIF_VALUE: 23
PIF_VALUE: 22
PIF_VALUE: 21
PIF_VALUE: 16
PIF_VALUE: 16
PIF_VALUE: 23
PIF_VALUE: 1
PIF_VALUE: 17
PIF_VALUE: 22
PIF_VALUE: 20
PIF_VALUE: 22
PIF_VALUE: 15
PIF_VALUE: 16
PIF_VALUE: 29
PIF_VALUE: 22
PIF_VALUE: 17
PIF_VALUE: 13
PIF_VALUE: 15
PIF_VALUE: 16
PIF_VALUE: 22
PIF_VALUE: 18
PIF_VALUE: 16
PIF_VALUE: 16
PIF_VALUE: 22
PIF_VALUE: 13
PIF_VALUE: 14
PIF_VALUE: 14
PIF_VALUE: 23
PIF_VALUE: 21
PIF_VALUE: 22
PIF_VALUE: 13
PIF_VALUE: 23
PIF_VALUE: 20
PIF_VALUE: 14
PIF_VALUE: 17
PIF_VALUE: 22
PIF_VALUE: 15
PIF_VALUE: 15
PIF_VALUE: 14
PIF_VALUE: 17
PIF_VALUE: 14
PIF_VALUE: 15
PIF_VALUE: 21
PIF_VALUE: 0
PIF_VALUE: 22
PIF_VALUE: 16
PIF_VALUE: 22
PIF_VALUE: 15
PIF_VALUE: 14
PIF_VALUE: 17
PIF_VALUE: 1
PIF_VALUE: 22
PIF_VALUE: 21
PIF_VALUE: 1
PIF_VALUE: 28
PIF_VALUE: 14
PIF_VALUE: 21
PIF_VALUE: 22
PIF_VALUE: 22
PIF_VALUE: 16
PIF_VALUE: 14
PIF_VALUE: 23
PIF_VALUE: 15
PIF_VALUE: 22
PIF_VALUE: 14
PIF_VALUE: 14
PIF_VALUE: 17
PIF_VALUE: 16
PIF_VALUE: 22
PIF_VALUE: 23
PIF_VALUE: 14
PIF_VALUE: 17
PIF_VALUE: 22
PIF_VALUE: 14
PIF_VALUE: 20
PIF_VALUE: 23
PIF_VALUE: 16
PIF_VALUE: 16
PIF_VALUE: 22
PIF_VALUE: 16
PIF_VALUE: 22
PIF_VALUE: 14
PIF_VALUE: 17
PIF_VALUE: 16
PIF_VALUE: 9
PIF_VALUE: 0
PIF_VALUE: 16
PIF_VALUE: 14
PIF_VALUE: 15
PIF_VALUE: 21
PIF_VALUE: 16
PIF_VALUE: 17
PIF_VALUE: 14
PIF_VALUE: 14
PIF_VALUE: 13
PIF_VALUE: 14
PIF_VALUE: 22
PIF_VALUE: 22
PIF_VALUE: 16
PIF_VALUE: 22
PIF_VALUE: 16
PIF_VALUE: 22
PIF_VALUE: 22
PIF_VALUE: 25
PIF_VALUE: 23
PIF_VALUE: 12
PIF_VALUE: 16

## 2022-03-31 ASSESSMENT — PAIN DESCRIPTION - ONSET: ONSET: ON-GOING

## 2022-03-31 ASSESSMENT — PAIN DESCRIPTION - DESCRIPTORS
DESCRIPTORS: ACHING;CRAMPING
DESCRIPTORS: CRAMPING

## 2022-03-31 ASSESSMENT — PAIN DESCRIPTION - PROGRESSION: CLINICAL_PROGRESSION: NOT CHANGED

## 2022-03-31 ASSESSMENT — PAIN DESCRIPTION - PAIN TYPE
TYPE: SURGICAL PAIN
TYPE: SURGICAL PAIN

## 2022-03-31 ASSESSMENT — PAIN SCALES - GENERAL
PAINLEVEL_OUTOF10: 2
PAINLEVEL_OUTOF10: 2
PAINLEVEL_OUTOF10: 9
PAINLEVEL_OUTOF10: 3
PAINLEVEL_OUTOF10: 6
PAINLEVEL_OUTOF10: 7
PAINLEVEL_OUTOF10: 8
PAINLEVEL_OUTOF10: 0
PAINLEVEL_OUTOF10: 2
PAINLEVEL_OUTOF10: 6

## 2022-03-31 ASSESSMENT — ENCOUNTER SYMPTOMS: SHORTNESS OF BREATH: 0

## 2022-03-31 ASSESSMENT — PAIN - FUNCTIONAL ASSESSMENT: PAIN_FUNCTIONAL_ASSESSMENT: 0-10

## 2022-03-31 ASSESSMENT — LIFESTYLE VARIABLES: SMOKING_STATUS: 0

## 2022-03-31 ASSESSMENT — PAIN DESCRIPTION - LOCATION: LOCATION: ABDOMEN;VAGINA

## 2022-03-31 ASSESSMENT — PAIN DESCRIPTION - FREQUENCY: FREQUENCY: INTERMITTENT

## 2022-03-31 NOTE — OP NOTE
Operative Note      Pre-operative Diagnosis:    1. Abnormal uterine bleeding  2. Dysmenorrhea  3. Uterine fibroid     Post-operative Diagnosis:    1. Abnormal uterine bleeding  2. Dysmenorrhea  3. Uterine fibroid     Procedure:  Laparoscopic assisted vaginal hysterectomy with bilateral salpingectomy     Surgeon:  RUBEN Madera DO      Assistant(s):  RUBEN Loja DO      Anesthesia:  General Endotracheal Anesthesia     Total IV fluids:  1500 ml     Urine Output:  100 ml     Estimated blood loss:  100 ml     Blood Transfusion?:  No     Drains:  none     Specimens:  Uterus, cervix and bilateral fallopian tubes     Complications:  none     Condition:  Stable to PACU    Findings:    1. Retroverted, boggy uterus  2. Normal appearing fallopian tubes and ovaries bilaterally, corpus luteum on the left ovary  3. Normal appearing right upper quadrant  4. Normal appearing cervix and external genitalia    Indications and Consent:   Mariia Pelaez is a 29 y.o. [id number] s/p  x3 who presents with a history of abnormal uterine bleeding. Patient is s/p endometrial ablation with return of menses approximately 3 months following. Since that time menses have been significantly more painful than prior to ablation. Has failed management in the past with IUD and OCPs. Ultrasound findings are suggestive of adenomyosis as well as small posterior uterine fibroid. Endometrial biopsy showed minute superficial strips of endometrium with no evidence of hyperplasia, atypia or malignancy. Risks, benefits and alternative were reviewed with the patient. Risks include, but are not limited to, infection, bleeding, injury to the uterus and surrounding pelvic structures, risks of anesthesia, need for further procedure and even death. All questions were answered to the patient's satisfaction. Consents are signed and in chart. Description of procedure:    The patient was taken to the operating room where general anesthesia was administered and found to be adequate. The patient was placed in the dorsal lithotomy position with yellow fin stirrups. The patient was prepped and draped in the normal sterile fashion. A universal time out was performed and all parties agreed to patient information and case details prior to the start of procedure. The patient was given Ancef for prophylaxis prior to the procedure. Dr. Arturo Morejon was present for assistance throughout the entire procedure due to lack of surgical residents at this facility. A payne catheter was inserted and tagged to the leg drape to allow for drainage throughout the case. .  A weighted speculum was placed in the vagina and the anterior portion of the cervix was grasped with a single tooth tenaculum. An acorn uterine manipulator was then placed within cervix and attached to tenaculum for aided manipulation during procedure. Legs were placed in the low lithotomy position and gloves were changed. Attention was then turned to the abdomen. 0.25% Marcaine with epinephrine was injected in the infraumbilical fold. A vertical skin incision was created using a scalpel. A 5mm bladeless trochar was placed  into the abdomen under direct laparoscopic visualization without difficulty. Pneumoperitoneum was obtained with CO2 gas. Inspection beneath trochar insertion site was noted to be free of trauma. A second 5mm trochar was placed in the right lower quadrant under direct laparoscopic visualization without difficulty. A third 5mm trochar was placed in the left lower quadrant under direct laparoscopic visualization without difficulty. Primary survey of the abdomen was performed and findings were noted above. Path of ureters noted bilaterally prior to start of procedure. Attention was turned to the patient's left adnexa. Fallopian tube was followed to the fimbriated end and grasped with an atraumatic grasper. The mesosalpinx was coagulated and transected using the Ligasure device. Traction was placed on the round ligament which was then coagulated and transected using the Ligasure Device. The anterior and posterior leaves of the broad ligament were then opened. The badder flap was then created and carried to midline with blunt and sharp dissection. The posterior broad ligament and utero-ovarian ligmaents were coagulated and transected with the Ligasure Device. Ascending uterine vessels were then coagulated and transected at the level of the cervix with the Ligasure Device. Attention was then turned to the right side. The fallopian tube was followed to the fimbriated end and grasped with an atraumatic grasper. The mesosalpinx was coagulated and transected using the Ligasure device. Traction was placed on the round ligament which was then coagulated and transected using the Ligasure Device. The anterior and posterior leaves of the broad ligament were then opened. The bladder flap was completed and using blunt dissection the bladder was mobilized in a caudad fashion. The posterior broad ligament and utero-ovarian ligmaents were coagulated and transected with the Ligasure Device. Ascending uterine vessels were then coagulated and transected at the level of the cervix with the Ligasure Device. Hemostasis was noted. All instruments were removed from the abdomen. The CO2 gas and laparoscopic light turned off, instruments were secured. Surgical drapes were placed over the abdomen. Attention was turned to the vaginal portion of the case. A weighted speculum was then placed in the patient's vagina. Uterine manipulator and tenaculum were removed. The anterior and posterior lips of the cervix were grasped with triple tooth tenaculae. The cervix was circumferentially infiltrated with a solution of 0.25% Marcaine with epinephrine. A circumferential incision was then made with a scalpel at the cervicovaginal junction. The incision was carried down sharply.   The vaginal mucosa was then dissected away from the cervix using a combination of both blunt and sharp dissection with reyes scissors. Anterior vesicouterine peritoneum was identified and entered sharply with reyes scissors. The anterior colpotomy was explored and intra-peritoneal location was confirmed. A curved haley was then placed through the anterior colpotomy. The uterus was elevated and the posterior peritoneum was identified and entered sharply. The posterior cul-de-sac was explored and found to be free from adhesions and intraperitoneal location confirmed. . A long weighted speculum was placed in the posterior incision. The left uterosacral ligament was then clamped with a Zeppelin clamp, transected with Reyes scissors, and suture ligated with 0 Vicryl suture. Suture tagged for later use. This was repeated with the right uterosacral ligament. The left cardinal ligament and parametrial tissues were identified and grasped using a Zeppelin clamp. This was transected and suture ligated using 0-Vicryl suture. The left aspect of the uterus was then noted to be free of attachments. Attention was turned to the right. The right cardinal ligament and parametrial tissues was then identified and grasped using a Zeppelin clamp. These were then transected and suture ligated using 0-Vicryl suture. The uterus was then noted to be free of attachments. The uterus, cervix and bilateral fallopian tubes were removed and handed off to be sent to pathology. Damp lap sponge was then placed in the posterior cul de sac. The vaginal vault as then irrigated with warm normal saline. Hemostasis of pedicles were noted. Anterior peritoneum was then grapsed with a long Susan clamp and peritoneum was closed in a pursestring fashion incorporating bilateral Uterosacral ligaments. Sponge was removed and cuff was tied down. Sponge counts noted to be correct. The vaginal cuff was then closed in a running, locked, fashion with 0 Vicryl suture.   Bilateral

## 2022-03-31 NOTE — CARE COORDINATION
CASE MANAGEMENT INITIAL ASSESSMENT      Reviewed chart and completed assessment with patient:and mom bedside  Family present: mom  Explained Case Management role/services. Primary contact information:[facility] Decision Maker :   Primary Decision Maker: Patito Garvey - Parent - [phone]    Secondary Decision Maker: Patrick Tyson - Parent - [phone]          Can this person be reached and be able to respond quickly, such as within a few minutes or hours? Yes    Admit date/status:3/31/22  Diagnosis:abnormal uterine bleeding   Is this a Readmission?:  No      Insurance:BCBS   Precert required for SNF: Yes       3 night stay required: No    Living arrangements, Adls, care needs, prior to admission:lives in 2 story home with 3 kids, 7,10,14 they are in care of father. Durable Medical Equipment at home:  Walker__Cane__RTS__ BSC__Shower Chair__  02__ HHN__ CPAP__  BiPap__  Hospital Bed__ W/C___ Other_____    Services in the home and/or outpatient, prior to admission:none    Current [address]    Transportation needs: none     Dialysis Facility (if applicable)   · Name:  · Address:  · Dialysis Schedule:  · Phone:  · Fax:    PT/OT recs:none    Hospital Exemption Notification (HEN):needed for SNf    Barriers to discharge:none    Plan/comments:spoke with patient. Plans on returning home at discharge. reported [employer]PT[employer] denied any DCP needs.  Jacque Balderas RN       ECOC on chart for MD signature

## 2022-03-31 NOTE — H&P
[facility] Ob/Gyn  History and Physical Attestation    Patient seen and evaluated prior to surgery. Patient reports no changes in medical condition. There have been no changes in the patient's medications or assessment. Preoperative tests and diagnostics have been reviewed. Surgery remains indicated as noted in History and Physical (H&P). Prophylactic antibiotics, use of beta-blockers and VTE prophylaxis have been addressed/ordered as indicated. Please see H&P and orders. History and Physical performed by patient's PCP Alberto Felix DO on 3/14/22 with \"Pt is at an acceptable risk for planned procedure. \"       All questions were answered to the patient's satisfaction. Consents are signed and on chart. Will proceed with procedure as planned.       Lawrence Smith DO

## 2022-03-31 NOTE — PROGRESS NOTES
Pt has small amount of blood in belly button incision. Appears to have stopped bleeding and is drying. Juliana Hare called and notified. No interventions at this time.

## 2022-03-31 NOTE — ANESTHESIA PRE PROCEDURE
Department of Anesthesiology  Preprocedure Note       Name:  Tri Diego   Age:  29 y.o.  :  1985                                          MRN:  [id number]         Date:  3/31/2022      Surgeon: Joao Herrera):  Ruddy Koyanagi, DO [place] Mt, DO    Procedure: Procedure(s):  LAPAROSCOPIC ASSISTED VAGINAL HYSTERECTOMY, BILATERAL SALPINGECTOMY, POSSIBLE BILATERAL OOPHORECTOMY    Medications prior to admission:   Prior to Admission medications    Medication Sig Start Date End Date Taking? Authorizing Provider   Ascorbic Acid (VITAMIN C) 250 MG tablet Take 250 mg by mouth daily   Yes Historical Provider, MD   B Complex-Biotin-FA (MULTI-B COMPLEX PO) Take by mouth    Historical Provider, MD   VITAMIN D PO Take 1 capsule by mouth daily     Historical Provider, MD   Cyanocobalamin (VITAMIN B-12 ER PO) Take 1 capsule by mouth daily     Historical Provider, MD   phentermine (ADIPEX-P) 37.5 MG tablet Take 37.5 mg by mouth every morning (before breakfast). Historical Provider, MD   valACYclovir (VALTREX) 500 MG tablet Take 500 mg by mouth as needed    Historical Provider, MD       Current medications:    Current Facility-Administered Medications   Medication Dose Route Frequency Provider Last Rate Last Admin    ceFAZolin (ANCEF) 2000 mg in dextrose 5 % 100 mL IVPB  2,000 mg IntraVENous On Call to [facility]        lidocaine PF 1 % injection 1 mL  1 mL IntraDERmal Once PRN Ginny Grimm MD        lactated ringers infusion   IntraVENous Continuous Ginny Grimm  mL/hr at 22 0647 New Bag at 22 0647    sodium chloride flush 0.9 % injection 10 mL  10 mL IntraVENous 2 times per day Ginny Grimm MD        sodium chloride flush 0.9 % injection 10 mL  10 mL IntraVENous PRN Ginny Grimm MD        0.9 % sodium chloride infusion  25 mL IntraVENous PRN Ginny Grimm MD           Allergies:     Allergies   Allergen Reactions    Sulfa Antibiotics Nausea And Vomiting       Problem List:    Patient Active Problem List   Diagnosis Code    Abnormal uterine bleeding (AUB) N93.9    Dysmenorrhea N94.6    Intramural leiomyoma of uterus D25.1       Past Medical History:        Diagnosis Date    Abnormal Pap smear of cervix     Herpes simplex virus (HSV) infection     History of HPV infection     Oligohydramnios, antepartum     Placental abruption     3 yrs ago    PONV (postoperative nausea and vomiting)     Wears contact lenses        Past Surgical History:        Procedure Laterality Date    COLPOSCOPY      DILATION AND CURETTAGE      DILATION AND CURETTAGE OF UTERUS N/A 10/18/2019    VIDEO HYSTEROSCOPY DILATATION AND CURETTAGE, NOVASURE ABLATION performed by Kristin Diop MD at [address] [place] [address]  10/18/2019    WISDOM TOOTH EXTRACTION      8 yrs ago       Social History:    Social History     Tobacco Use    Smoking status: Never Smoker    Smokeless tobacco: Never Used   Substance Use Topics    Alcohol use: Yes     Comment: 6 drinks/week                                Counseling given: Not Answered      Vital Signs (Current):   Vitals:    03/24/22 1444 03/31/22 0629   BP:  101/78   Pulse:  75   Resp:  20   Temp:  96.9 °F (36.1 °C)   TempSrc:  Temporal   SpO2:  98%   Weight: 147 lb (66.7 kg) 144 lb 14.4 oz (65.7 kg)   Height: 5' 4\" (1.626 m) 5' 4\" (1.626 m)                                              BP Readings from Last 3 Encounters:   03/31/22 101/78   03/16/22 110/66   03/14/22 96/68       NPO Status:  MN+, SEE MAR                                                                               BMI:   Wt Readings from Last 3 Encounters:   03/31/22 144 lb 14.4 oz (65.7 kg)   03/16/22 147 lb (66.7 kg)   03/14/22 145 lb (65.8 kg)     Body mass index is 24.87 kg/m².     CBC:   Lab Results   Component Value Date    WBC 9.7 03/24/2022    RBC 4.57 03/24/2022    HGB 14.2 03/24/2022    HCT 42.3 03/24/2022    MCV 92.4 03/24/2022    RDW 13.0 03/24/2022     03/24/2022       CMP:   Lab Results   Component Value Date     03/24/2022    K 4.4 03/24/2022     03/24/2022    CO2 23 03/24/2022    BUN 10 03/24/2022    CREATININE 0.7 03/24/2022    GFRAA >60 03/24/2022    AGRATIO 1.5 04/05/2019    LABGLOM >60 03/24/2022    GLUCOSE 85 03/24/2022    PROT 7.1 04/05/2019    CALCIUM 9.4 03/24/2022    BILITOT 0.3 04/05/2019    ALKPHOS 61 04/05/2019    AST 19 04/05/2019    ALT 20 04/05/2019       POC Tests: No results for input(s): POCGLU, POCNA, POCK, POCCL, POCBUN, POCHEMO, POCHCT in the last 72 hours. Coags: No results found for: PROTIME, INR, APTT    HCG (If Applicable):   Lab Results   Component Value Date    PREGTESTUR Negative 03/31/2022        ABGs: No results found for: PHART, PO2ART, ZOP6CWG, PLZ1EFY, BEART, E1MOUDGF     Type & Screen (If Applicable):  Lab Results   Component Value Date    LABABO O 06/22/2011    [address] Positive 06/22/2011       Drug/Infectious Status (If Applicable):  No results found for: HIV, HEPCAB    COVID-19 Screening (If Applicable): No results found for: COVID19        Anesthesia Evaluation  Patient summary reviewed   history of anesthetic complications: PONV. Airway: Mallampati: II  TM distance: >3 FB   Neck ROM: full  Mouth opening: > = 3 FB Dental:          Pulmonary:Negative Pulmonary ROS breath sounds clear to auscultation      (-) COPD, asthma, shortness of breath, recent URI, sleep apnea and not a current smoker          Patient did not smoke on day of surgery.                  Cardiovascular:Negative CV ROS        (-) pacemaker, hypertension, past MI, CAD, CABG/stent, dysrhythmias,  angina and  CHF      Rhythm: regular  Rate: normal           Beta Blocker:  Not on Beta Blocker         Neuro/Psych:   Negative Neuro/Psych ROS     (-) seizures, TIA, CVA and psychiatric history           GI/Hepatic/Renal: Neg GI/Hepatic/Renal ROS       (-) GERD, liver disease, no renal disease, bowel prep and no morbid obesity Endo/Other:    (+) no malignancy/cancer. (-) diabetes mellitus, hypothyroidism, hyperthyroidism, blood dyscrasia, arthritis, no malignancy/cancer               Abdominal:       Abdomen: soft. Vascular: negative vascular ROS. Other Findings:             Anesthesia Plan      general     ASA 2       Induction: intravenous. MIPS: Postoperative opioids intended and Prophylactic antiemetics administered. Anesthetic plan and risks discussed with patient and mother. Plan discussed with CRNA. This pre-anesthesia assessment may be used as a history and physical.    DOS STAFF ADDENDUM:    Pt seen and examined, chart reviewed (including anesthesia, drug and allergy history). No interval changes to history and physical examination. Anesthetic plan, risks, benefits, alternatives, and personnel involved discussed with patient. Patient verbalized an understanding and agrees to proceed.       Carlos Tavares MD  March 31, 2022  6:51 AM      Carlos Tavares MD   3/31/2022

## 2022-03-31 NOTE — BRIEF OP NOTE
Department of Gynecology  Brief Operative Report        Pre-operative Diagnosis:    1. Abnormal uterine bleeding  2. Dysmenorrhea  3. Uterine fibroid    Post-operative Diagnosis:    1. Abnormal uterine bleeding  2. Dysmenorrhea  3. Uterine fibroid    Procedure:  Laparoscopic assisted vaginal hysterectomy with bilateral salpingectomy    Surgeon:  RUBEN Madera DO      Assistant(s):  RUBEN Crews DO     Anesthesia:  General Endotracheal Anesthesia    Findings:    1. Retroverted, boggy uterus  2. Normal appearing fallopian tubes and ovaries bilaterally, corpus luteum on the left ovary  3. Normal appearing right upper quadrant  4. Normal appearing cervix and external genitalia    Total IV fluids:  1500 ml    Urine Output:  100 ml    Estimated blood loss:  100 ml    Blood Transfusion?:  No     Drains:  none    Specimens:  Uterus, cervix and bilateral fallopian tubes    Complications:  none    Condition:  Stable to PACU    See dictated operative report for full details.     Weston Jessica DO

## 2022-03-31 NOTE — PROGRESS NOTES
Post-Operative Gynecology Progress Note       [facility] Obstetrics and Gynecology    Subjective:  Jean-Paul Lazo is a 29 y.o. female who is s/p MERVIN HICKMAN, POD #0    Patient is doing well today without complaints. Pain is well controlled with Toradol. Reports when she just got up to the bathroom she had a small amount of bright red bleeding when she wiped. When she got back in bed she had an additional amount run down her leg. Denies worsening pain, mostly cramping. Denies clots. Tolerating regular diet without nausea or vomiting. Ambulating without difficulty, denies dizziness on standing. Voiding without difficulty, denies flatus. Denies chest pain, shortness of breath, fever, chills, calf pain or tenderness. Review of Systems - The following ROS was otherwise negative, except as noted in the HPI: constitutional, respiratory, cardiovascular, gastrointestinal, genitourinary    Objective:  Vitals:    03/31/22 1556   BP: 107/71   Pulse: 75   Resp: 18   Temp: 98.6 °F (37 °C)   SpO2: 100%       General: Alert, well appearing, no acute distress  Heart: Regular rate and rhythm, no murmurs, gallops or rubs  Lungs: Respirations unlabored  Abdomen: Soft, appropriately tender to palpation, non-distended, no rebound or guarding. Incisions with dressing in place, clean, dry and intact. Mild drainage from umbilical incision, dried currently. Pelvic: Normal appearing external genitalia. Bimanual exam performed with intact cuff and no evidence of developing hematoma. Extremities: No redness or tenderness in LE, neg Viv's sign. SCDs in place bilaterally       Lab Results:   Post-op Hgb 10.8    Assessment:    Jean-Paul Lazo is a 29 y.o. female who is s/p MERVIN HICKMAN, POD #0    1. POD #0     - Doing well     - Meeting milestones     - Some vaginal bleeding, benign exam, small amount of blood on glove     - Pain well controlled     - Adequate urine output     - Reassurance provided     2.  Hypotension - Asymptomatic     - HR 60s-70s     - Pain well controlled     - Hgb stable - will repeat due to bleeding    Plan:     1. Continue routine post-op care  2. Continue pain control  3. Encourage ambulation  4. Repeat Hgb pending  5.  Continue to monitor pad counts closely    Disposition:   In house for routine post-operative care    Corey Cavazos DO

## 2022-03-31 NOTE — ANESTHESIA POSTPROCEDURE EVALUATION
Department of Anesthesiology  Postprocedure Note    Patient: Miguel Ortiz  MRN: [id number]  YOB: 1985  Date of evaluation: 3/31/2022  Time:  11:53 AM     Procedure Summary     Date: 03/31/22 Room / Location: [address]    Anesthesia Start: 0730 Anesthesia Stop: 6248    Procedure: LAPAROSCOPIC ASSISTED VAGINAL HYSTERECTOMY, BILATERAL SALPINGECTOMY (N/A Abdomen) Diagnosis:       Abnormal uterine bleeding (AUB)      Dysmenorrhea      (ABNORMAL UTERINE BLEEDING, DYSMENORRHEA)    Surgeons: Arlena Kanner, DO Responsible Provider: Abdullahi Florez MD    Anesthesia Type: general ASA Status: 2          Anesthesia Type: general    Edgardo Phase I: Edgardo Score: 8    Edgardo Phase II:      Last vitals: Reviewed and per EMR flowsheets.      Vitals:    03/31/22 1110 03/31/22 1115 03/31/22 1120 03/31/22 1125   BP: 100/68 91/63 93/61    Pulse: 77 68 62 62   Resp:       Temp:       TempSrc:       SpO2: 100% 100% 99% 100%   Weight:       Height:         Anesthesia Post Evaluation    Patient location during evaluation: bedside  Patient participation: complete - patient participated  Level of consciousness: awake and alert  Airway patency: patent  Nausea & Vomiting: no nausea  Complications: no  Cardiovascular status: hemodynamically stable  Respiratory status: acceptable  Hydration status: euvolemic

## 2022-04-01 VITALS
OXYGEN SATURATION: 97 % | TEMPERATURE: 98.4 F | WEIGHT: 144.9 LBS | HEART RATE: 93 BPM | DIASTOLIC BLOOD PRESSURE: 58 MMHG | HEIGHT: 64 IN | RESPIRATION RATE: 16 BRPM | BODY MASS INDEX: 24.74 KG/M2 | SYSTOLIC BLOOD PRESSURE: 93 MMHG

## 2022-04-01 LAB
HCT VFR BLD CALC: 32 % (ref 36–48)
HEMOGLOBIN: 10.7 G/DL (ref 12–16)

## 2022-04-01 PROCEDURE — G0378 HOSPITAL OBSERVATION PER HR: HCPCS

## 2022-04-01 PROCEDURE — 36415 COLL VENOUS BLD VENIPUNCTURE: CPT

## 2022-04-01 PROCEDURE — 85014 HEMATOCRIT: CPT

## 2022-04-01 PROCEDURE — 96361 HYDRATE IV INFUSION ADD-ON: CPT

## 2022-04-01 PROCEDURE — 6370000000 HC RX 637 (ALT 250 FOR IP): Performed by: OBSTETRICS & GYNECOLOGY

## 2022-04-01 PROCEDURE — 85018 HEMOGLOBIN: CPT

## 2022-04-01 PROCEDURE — 6360000002 HC RX W HCPCS: Performed by: OBSTETRICS & GYNECOLOGY

## 2022-04-01 PROCEDURE — 96376 TX/PRO/DX INJ SAME DRUG ADON: CPT

## 2022-04-01 PROCEDURE — 2580000003 HC RX 258: Performed by: OBSTETRICS & GYNECOLOGY

## 2022-04-01 PROCEDURE — 99024 POSTOP FOLLOW-UP VISIT: CPT | Performed by: OBSTETRICS & GYNECOLOGY

## 2022-04-01 RX ORDER — IBUPROFEN 800 MG/1
800 TABLET ORAL EVERY 8 HOURS PRN
Qty: 60 TABLET | Refills: 1 | Status: SHIPPED | OUTPATIENT
Start: 2022-04-01

## 2022-04-01 RX ORDER — OXYCODONE HYDROCHLORIDE AND ACETAMINOPHEN 5; 325 MG/1; MG/1
1 TABLET ORAL EVERY 6 HOURS PRN
Qty: 28 TABLET | Refills: 0 | Status: SHIPPED | OUTPATIENT
Start: 2022-04-01 | End: 2022-04-08

## 2022-04-01 RX ORDER — FERROUS SULFATE 325(65) MG
325 TABLET ORAL
Qty: 30 TABLET | Refills: 0 | Status: SHIPPED | OUTPATIENT
Start: 2022-04-01 | End: 2022-06-07 | Stop reason: ALTCHOICE

## 2022-04-01 RX ORDER — DOCUSATE SODIUM 100 MG/1
100 CAPSULE, LIQUID FILLED ORAL 2 TIMES DAILY PRN
Qty: 60 CAPSULE | Refills: 0 | Status: SHIPPED | OUTPATIENT
Start: 2022-04-01 | End: 2022-05-01

## 2022-04-01 RX ADMIN — OXYCODONE AND ACETAMINOPHEN 1 TABLET: 5; 325 TABLET ORAL at 11:36

## 2022-04-01 RX ADMIN — KETOROLAC TROMETHAMINE 30 MG: 30 INJECTION, SOLUTION INTRAMUSCULAR; INTRAVENOUS at 02:54

## 2022-04-01 RX ADMIN — SODIUM CHLORIDE, POTASSIUM CHLORIDE, SODIUM LACTATE AND CALCIUM CHLORIDE: 600; 310; 30; 20 INJECTION, SOLUTION INTRAVENOUS at 02:55

## 2022-04-01 ASSESSMENT — PAIN SCALES - GENERAL
PAINLEVEL_OUTOF10: 5
PAINLEVEL_OUTOF10: 5
PAINLEVEL_OUTOF10: 6

## 2022-04-01 NOTE — PROGRESS NOTES
BP remains soft 91/44, remainder of VSS. Pt a/o x 4. Asymptomatic. Ambulates independently with steady gait. Urine output is WNL. Urine is clear yellow. Pt states she has a smear of blood when wiping after voiding. Lap incisions remains dry and intact with steri strips. Denies dizziness upon standing or during ambulation. Pain is controlled with scheduled toradol. Tolerating PO intake well. Will continue to monitor.      Electronically signed by Ana Luisa Jones RN on 4/1/2022 at 5:20 AM

## 2022-04-01 NOTE — PROGRESS NOTES
Shift assessment completed. Pt A&O x4, VSS. x3 lap incisions with steri strips all C/D/I. Non skid socks on. Pt reporting no pain currently, small amounts of bleeding when wiping and sitting up per pt. MD rounding at bedside currently, encouraged pt to ambulate in the halls and see how she feels afterwards. Non skid socks on. Denies any needs at this time. Bed locked and in lowest position. Call light and bedside table within reach. Will continue to monitor.

## 2022-04-01 NOTE — PROGRESS NOTES
Shift assessment complete as charted. Meds administered as ordered, see eMAR.      - a/o x 4, BP soft 94/55 remainder of VSS  - ambulates independently with steady gait  - lap incisions x 3 to abdomen with steri strips  - rates surgical pain in abdomen 2/10  - lungs CTA in all lobes  - BS active x 4, denies passing flatus  - small amount of blood from vagina on bed pad  - urine clear yellow, denies difficulty voiding    Nonskid footwear on. Calls appropriately for assistance. Bed in low position, wheels locked, SR x 2, call light and bedside table within reach. Will monitor.       Electronically signed by Karena Felix RN on 3/31/2022 at 10:02 PM

## 2022-04-01 NOTE — PROGRESS NOTES
Post-Operative Gynecology Progress Note       [facility] Obstetrics and Gynecology    Subjective:  Trina Nunez is a 29 y.o. female who is s/p MERVIN HICKMAN, POD #1    Patient is doing well today. Continues to have light bleeding. Pain is well controlled with Toradol. Tolerating regular diet without nausea or vomiting. Ambulating without difficulty, denies dizziness on standing. Voiding without difficulty, denies flatus. Denies chest pain, shortness of breath, fever, chills, calf pain or tenderness. Review of Systems - The following ROS was otherwise negative, except as noted in the HPI: constitutional, respiratory, cardiovascular, gastrointestinal, genitourinary    Objective:  Vitals:    04/01/22 0830   BP: (!) 93/58   Pulse: 93   Resp: 16   Temp: 98.4 °F (36.9 °C)   SpO2: 97%       General: Alert, well appearing, no acute distress  Heart: Regular rate and rhythm, no murmurs, gallops or rubs  Lungs: Respirations unlabored, clear to auscultation bilaterally. No wheezes, rhonchi or rales  Abdomen: Soft, appropriately tender to palpation, non-distended, no rebound or guarding. Bowel sounds active in 4 quadrants. Incisions with dressing in place, stable drainage on umbilical steri-strips. No significant drainage or surrounding erythema. Pelvic: Speculum exam performed with intact cuff, no active bleeding visualized. Dark blood noted within the vault. Bimanual exam with intact cuff, no palpable hematoma. Extremities: No redness or tenderness in LE, neg Viv's sign, SCDs in place bilaterally     Lab Results:   Lab Results   Component Value Date    WBC 9.7 03/24/2022    HGB 10.7 (L) 04/01/2022    HCT 32.0 (L) 04/01/2022    MCV 92.4 03/24/2022     03/24/2022        Assessment:    Trina Nunez is a 29 y.o. female who is s/p MERVIN HICKMAN, POD #1    1.  POD #1     - Doing well     - Meeting milestones     - Stable bleeding, benign exam - reviewed consideration for vaginal cuff hematoma, however has stable vital signs, appropriate hemoglobin, adequate urine output and adequate pain control     - Pain well controlled     - Adequate urine output     - Reassurance provided      - Stable for discharge home     2. Hypotension     - Asymptomatic     - HR stable     - Pain well controlled     - Hgb stable 10.7 from 11.8     Plan:      1. Continue routine post-op care  2. Continue pain control  3.  Encourage ambulation    Disposition:   Stable for discharge home  Discharge instructions reviewed  Return precautions reviewed   Rx provided for Motrin, Percocet, Iron and Colace  Follow-up in 1 week    Glenn Lan DO

## 2022-04-01 NOTE — DISCHARGE SUMMARY
[facility] Ob/Gyn  GYN Discharge Summary        Admitting Diagnosis:   1. Abnormal uterine bleeding  2. Dysmenorrhea  3. Uterine fibroid    Discharge Diagnosis:   1. Abnormal uterine bleeding  2. Dysmenorrhea  3. Uterine fibroid    Procedures:   1. Laparoscopic assisted vaginal hysterectomy with bilateral salpingectomy     Referrals:   1. None    Reason for Hospitalization:   1. Routine post-operative care    Hospital Course:   Haroldo Robins is a 29 y.o. [id number] who presents for scheduled LAVH, BS due to AUB, uterine fibroid and painful menses. Patient underwent procedure as scheduled. Patient is doing well post-operatively, meeting milestones. Post-operative course complicated by vaginal bleeding. Benign exam, appropriate Hgb, VSS (hypotension present though asymptomatic and present throughout admission), with adequate urine output and adequate pain control. Differential reviewed including routine post-operative bleeding, vaginal cuff hematoma. Less likely active intra-abdominal bleeding due to stability of symptoms. Patient is doing well with ambulation with minimal bleeding. Patient stable for discharge home on POD #1. Please see electronic record for complete hospitalization details. Complications:   None     Discharge Instructions:  Please call for increased pain not controlled by pain medications, significant vaginal bleeding greater than 1 pad/hour, temperature greater than 101 degrees F or any other concerns. Plan to follow up in 1 weeks. Diet:common adult    Activity:  Increase activity gradually. Pelvic rest for 8 weeks. No intercourse, tampons, douching, baths.    No heavy lifting or driving for 2 weeks    Medications:   - Ibuprofen    - Percocet    - Iron / Colace     Disposition: Home    Condition on discharge: Stable    Follow up: in 1 week(s)    Dana Caraballo DO

## 2022-04-01 NOTE — PROGRESS NOTES
Pt's verbal and written discharge instructions given, all questions answered. IV removed. Follow up appointments made and discussed. Pt has follow up appt with Dr. Sarah Thurman on Monday April 11th. New medications reviewed and sent to outpatient pharmacy for pt to pickup. Pt left in stable condition via wheelchair to private car with family.

## 2022-04-04 ENCOUNTER — TELEPHONE (OUTPATIENT)
Dept: FAMILY MEDICINE CLINIC | Age: 37
End: 2022-04-04

## 2022-04-04 ENCOUNTER — CARE COORDINATION (OUTPATIENT)
Dept: OTHER | Facility: CLINIC | Age: 37
End: 2022-04-04

## 2022-04-04 RX ORDER — PHENTERMINE HYDROCHLORIDE 37.5 MG/1
37.5 CAPSULE ORAL EVERY MORNING
COMMUNITY

## 2022-04-04 NOTE — TELEPHONE ENCOUNTER
[facility] Transitions Initial Follow Up Call    Outreach made within 2 business days of discharge: Yes    Patient: Jean-Paul Lazo Patient : 1985   MRN: [id number]  Reason for Admission: There are no discharge diagnoses documented for the most recent discharge. Discharge Date: 22       Spoke with: Patient, TCM not needed, f/u with specialist from procedure    Discharge department/facility: [facility]    Non-face-to-face services provided:  Obtained and reviewed discharge summary and/or continuity of care documents    TCM Interactive Patient Contact:  Was patient able to fill all prescriptions: Yes  Was patient instructed to bring all medications to the follow-up visit: Yes  Is patient taking all medications as directed in the discharge summary?  Yes  Does patient understand their discharge instructions: Yes  Does patient have questions or concerns that need addressed prior to 7-14 day follow up office visit: no    Scheduled appointment with PCP within 7-14 days    Follow Up  Future Appointments   Date Time Provider Jose Enrique Palma   2022 10:00 AM DO [facility] GRISELDA Gutierrez RN

## 2022-04-04 NOTE — CARE COORDINATION
Sneha 45 Transitions Initial Follow Up Call    Call within 2 business days of discharge: Yes    Patient: Oleg Mortensen Patient : 1985    MRN: [id number]  Reason for Admission: S/P laparoscopic assisted vaginal hysterectomy (LAVH)    Discharge Date: 22 RARS: Readmission Risk Score: 2.1 ( )      Last Discharge [facility]       Complaint Diagnosis Description Type Department Provider    3/31/22  S/P laparoscopic assisted vaginal hysterectomy (LAVH) . .. Admission (Discharged) [address] [place] [address], DO           Spoke with: Patient    Transitions of Care Initial Call    Was this an external facility discharge? No Discharge Facility: [place] [address] to be reviewed by the provider   Additional needs identified to be addressed with provider: No  none             Method of communication with provider : none      Advance Care Planning:   Does patient have an Advance Directive: on file. Was this a readmission? No  Patient stated reason for admission: hysterectomy  Patients top risk factors for readmission: medical condition-S/P hysterectomy (LAVH)    Care Transition Nurse (CTN) contacted the patient by telephone to perform post hospital discharge assessment. Verified name and  with patient as identifiers. Provided introduction to self, and explanation of the CTN role. CTN reviewed discharge instructions, medical action plan and red flags with patient who verbalized understanding. Patient given an opportunity to ask questions and does not have any further questions or concerns at this time. Were discharge instructions available to patient? Yes. Reviewed appropriate site of care based on symptoms and resources available to patient including: PCP  Specialist  Benefits related nurse triage line  When to call [address] The patient agrees to contact the PCP office for questions related to their healthcare.      Medication reconciliation was performed with patient, who verbalizes understanding of administration of home medications. Advised obtaining a 90-day supply of all daily and as-needed medications. Covid Risk Education     Educated patient about risk for severe COVID-19 due to risk factors according to CDC guidelines. ACM reviewed discharge instructions, medical action plan and red flag symptoms with the patient who verbalized understanding. Discussed COVID vaccination status: No. Education provided on COVID-19 vaccination as appropriate. Discussed exposure protocols and quarantine with CDC Guidelines. Patient was given an opportunity to verbalize any questions and concerns and agrees to contact ACM or health care provider for questions related to their healthcare. Pt states she is doing good; rates pain 4/10 this morning; has not taken any pain meds this morning; states gets relief from percocet or ibuprofen. Patient instructed to refrain from driving while taking Narcotics. Pt denies any nausea or vomiting; denies problems with urination. Pt states she has not had a bowel movement since she left hospital on 4/1. ACM educated pt on increased fluids, increasing fiber in diet (metamucil; pt states has miralax) and use of colace 2x day as needed. Pt states 3 incision sites look good, starting to get itchy. Patient denies any s/s of infection. Patient instructed on s/s of infection to report including but not limited to fever, chills, foul smelling drainage, increased warmth, redness or hardness around incision. Pt states vaginal bleeding has decreased, only small amounts on pad. ACM educated pt on high fiber and iron rich foods and sent pt handouts via my chart. Pt denies any further questions, needs or concerns; is agreeable to [facility] follow up outreach. [facility] provided contact information. Plan for follow-up call in 3-5 days based on severity of symptoms and risk factors.   Plan for next call: symptom management-pain, s/sx of infection  self management-bowel movements, appetite  medication management-any changes in meds or questions    Non-face-to-face services provided:  Scheduled appointment with Luiz Almodovar has postop appt with Gyn on 4/11  Obtained and reviewed discharge summary and/or continuity of care documents  Education of patient/family/caregiver/guardian to support self-management-ACM educated pt on red flags and appropriate sites of care, including PCP, specialist, nurse access line, when to go to urgent care/ED and when to call 911 for life threatening emergencies and pt verbalized understanding  Assessment and support for treatment adherence and medication management-reviewed meds with pt and answered questions    Care Transitions 24 Hour Call    Do you have any ongoing symptoms?: Yes  Patient-reported symptoms: Abdominal Pain  Do you have a copy of your discharge instructions?: Yes  Do you have all of your prescriptions and are they filled?: Yes  Have you been contacted by a [address]?: No  Have you scheduled your follow up appointment?: Yes  How are you going to get to your appointment?: Car - family or friend to transport  Were you discharged with any Home Care or Post Acute Services: No  Do you have support at home?: Child  Do you feel like you have everything you need to keep you well at home?: Yes  Care Transitions Interventions     Other Services:  (Comment: 4/4-sent pt handouts on high fiber and high iron foods; gave pt [facility] financial assistance number)        Goals      Conditions and Symptoms      I will schedule office visits, as directed by my provider. I will keep my appointment or reschedule if I have to cancel. I will notify my provider of any barriers to my plan of care. I will notify my provider of any symptoms that indicate a worsening of my condition.     Barriers: financial and lack of education  Plan for overcoming my barriers: work with ACM  Confidence: 9/10  Anticipated Goal Completion Date: 5/4/22 4/4-pt has f/u with GYN on 4/11                Follow Up  Future Appointments   Date Time Provider Jose Enrique Palma   4/11/2022 10:00 AM DO [facility] GRISELDA Martini MSN, RN   Ambulatory Care Manager  Associate Care Management  Cell [phone]  [email or web address]

## 2022-04-06 ENCOUNTER — TELEPHONE (OUTPATIENT)
Dept: OBGYN CLINIC | Age: 37
End: 2022-04-06

## 2022-04-06 NOTE — TELEPHONE ENCOUNTER
Patient called and stated she is having issues with constipation since her surgery, she took miralax one dose once daily for 3 days, her stomach is upset, and has used docusate sodium one capsule BID. She is trying to see if there is something else to take or help. She is afraid to strain since she just had surgery on the 31st.     Please advise.      Routing to Dr. Major Dixon

## 2022-04-07 ENCOUNTER — CARE COORDINATION (OUTPATIENT)
Dept: OTHER | Facility: CLINIC | Age: 37
End: 2022-04-07

## 2022-04-07 NOTE — CARE COORDINATION
Sneha 45 Transitions Follow Up Call    2022    Patient: Jean-Paul Lazo  Patient : 1985    MRN: [id number]  Reason for Admission: S/P laparoscopic assisted vaginal hysterectomy (LAVH)    Discharge Date: 22 RARS: Readmission Risk Score: 2.1 ( )       Spoke with: Patient    Care Transitions Follow Up Call    Needs to be reviewed by the provider   Additional needs identified to be addressed with provider: No  none             Method of communication with provider : none      Care Transition Nurse (CTN) contacted the patient by telephone to follow up after admission on 3/31/22. Verified name and  with patient as identifiers. Addressed changes since last contact: none  Discussed follow-up appointments. If no appointment was previously scheduled, appointment scheduling offered: Yes and pt has scheduled f/u appt for  with surgeon. Is follow up appointment scheduled within 7 days of discharge? No.    Pt reports she is feeling much better since she had a large bowel movement today. Pt rates pain level at 2/10 and states has not been taking any pain meds. Patient denies any s/s of infection. Patient instructed on s/s of infection to report including but not limited to fever, chills, foul smelling drainage, increased warmth, redness or hardness around incision. Pt reports that vaginal bleeding is very minimal; wearing light days pad. Pt reports eating and drinking okay. ACM discussed importance of continuing bowel regime and increased fluids to assist with BM. Pt denies any further questions, needs or concerns, stating everything is going well; is agreeable to ACM follow up outreach. Advance Care Planning:   Does patient have an Advance Directive: on file. CTN reviewed discharge instructions, medical action plan and red flags with patient and discussed any barriers to care and/or understanding of plan of care after discharge.  Discussed appropriate site of care based on symptoms and resources available to patient including: PCP  Specialist  Benefits related nurse triage line  When to call [address] The patient agrees to contact the PCP office for questions related to their healthcare. Patients top risk factors for readmission: medical condition-S/P hysterectomy (LAVH)  Interventions to address risk factors: Scheduled appointment with Jaquelin yin f/u appt scheduled with surgeon on 4/11 and Education of patient/family/caregiver/guardian to support self-management-ACM educated pt on red flags and appropriate sites of care, including PCP, specialist, nurse access line, when to go to urgent care/ED and when to call 911 for life threatening emergencies and pt verbalized understanding      Non-BS follow up appointment(s): none known      CTN provided contact information for future needs. Plan for follow-up call in 7-10 days based on severity of symptoms and risk factors. Plan for next call: symptom management-pain, incision sites, BMs  follow up appointment-4/11 with Dr. Joe Lorenzo  medication management-any new or changed meds      Care Transitions Subsequent and Final Call    Subsequent and Final Calls  Do you have any ongoing symptoms?: No  Have your medications changed?: No  Do you have any questions related to your medications?: No  Do you currently have any active services?: No  Do you have any needs or concerns that I can assist you with?: No  Identified Barriers: None  Care Transitions Interventions  No Identified Needs     Other Services:  (Comment: 4/4-sent pt handouts on high fiber and high iron foods; gave pt [facility] financial assistance number)   Other Interventions:         Goals      Conditions and Symptoms      I will schedule office visits, as directed by my provider. I will keep my appointment or reschedule if I have to cancel. I will notify my provider of any barriers to my plan of care.   I will notify my provider of any symptoms that indicate a worsening of my condition. Barriers: financial and lack of education  Plan for overcoming my barriers: work with ACM  Confidence: 9/10  Anticipated Goal Completion Date: 5/4/22 4/4-pt has f/u with GYN on 4/11              Follow Up  Future Appointments   Date Time Provider Jose Enrique Palma   4/11/2022 10:00 AM Ulysses Pacer, DO [facility] GRISELDA Garnica MSN, RN   Ambulatory Care Manager  Associate Care Management  Cell [phone]  [email or web address]

## 2022-04-07 NOTE — TELEPHONE ENCOUNTER
Patient returned call to office. Stated her vaginal bleeding has almost completely subsided, she has not taken pain meds since before yesterday, stated she did have a large BM this morning. And she is feeling much better!      Routing to Dr. Zhen Trivedi

## 2022-04-07 NOTE — TELEPHONE ENCOUNTER
Called patient to review. Left message on machine to call office back. When patient calls back please review okay to continue colace and mirilax and may do over the counter enema for further relief. Please make sure patient is at least passing gas, as if she is not doing this she may need further evaluation with imaging in the ED. Also please inquire as to pain control and vaginal bleeding post-op. Thank you.

## 2022-04-11 ENCOUNTER — OFFICE VISIT (OUTPATIENT)
Dept: OBGYN CLINIC | Age: 37
End: 2022-04-11

## 2022-04-11 ENCOUNTER — TELEPHONE (OUTPATIENT)
Dept: OBGYN CLINIC | Age: 37
End: 2022-04-11

## 2022-04-11 VITALS
DIASTOLIC BLOOD PRESSURE: 70 MMHG | HEART RATE: 85 BPM | WEIGHT: 145 LBS | TEMPERATURE: 98.7 F | SYSTOLIC BLOOD PRESSURE: 106 MMHG | BODY MASS INDEX: 24.89 KG/M2

## 2022-04-11 DIAGNOSIS — N93.9 ABNORMAL UTERINE BLEEDING (AUB): ICD-10-CM

## 2022-04-11 DIAGNOSIS — Z09 POSTOPERATIVE EXAMINATION: Primary | ICD-10-CM

## 2022-04-11 DIAGNOSIS — D25.1 INTRAMURAL LEIOMYOMA OF UTERUS: ICD-10-CM

## 2022-04-11 DIAGNOSIS — Z90.710 S/P LAPAROSCOPIC ASSISTED VAGINAL HYSTERECTOMY (LAVH): ICD-10-CM

## 2022-04-11 DIAGNOSIS — N94.6 DYSMENORRHEA: ICD-10-CM

## 2022-04-11 PROCEDURE — 99024 POSTOP FOLLOW-UP VISIT: CPT | Performed by: OBSTETRICS & GYNECOLOGY

## 2022-04-11 NOTE — TELEPHONE ENCOUNTER
- Rest.    - Drink plenty of fluids.    - Tylenol or Ibuprofen as directed as needed for fever/pain.    - Follow up with your PCP or specialty clinic as directed in the next 1-2 weeks if not improved or as needed.  You can call [phone] to schedule an appointment with the appropriate provider.    - Go to the ED if your symptoms worsen.  - You must understand that you have received an Urgent Care treatment only and that you may be released before all of your medical problems are known or treated.   - You, the patient, will arrange for follow up care as instructed.   - If your condition worsens or fails to improve we recommend that you receive another evaluation at the ER immediately or contact your PCP to discuss your concerns or return here.       Bladder Infection (Cystitis), Female (Child)  A bladder infection is when bacteria cause the bladder to be inflamed. The bladder holds urine. A tube called the urethra takes urine from the bladder out of the body. Sometimes bacteria can travel up the urethra. This causes the infection. Girls have bladder infections more often than boys. This is because the urethra is much shorter in girls than in boys.  The most common cause of bladder infections in children is bacteria from the bowels. The bacteria can get onto the skin around the urethra, and then into the urine. From there it can travel up to the bladder. This can happen because of:  · Poor cleaning after using the toilet or during a diaper change  · Not completely emptying the bladder  · Constipation that prevents the bladder from emptying completely  · Not drinking enough fluids to urinate often  · Irritation of the urethra from soaps or tight clothes  Symptoms of a bladder infection include the need to urinate often and urgently. It may be painful. The urine may have a strong smell. It may be dark, tinted with blood, or cloudy. Your child may not be able to hold urine and may wet the bed or her clothes. Your  Please make sure we have the stay on her prior authorization correct and work with the billing department for such. Thank you! child may also have a fever and belly pain. Some children dont have symptoms. A baby may be fussy and not able to be soothed. She may cry when urinating. Your baby may also feed less or be less active.  A bladder infection is treated with antibiotics. The healthcare provider may also prescribe a medicine to treat pain. Children get better from a bladder infection quickly.  In many cases a bladder infection will come back. Its important to take steps to prevent it (see below).  Home care  The healthcare provider will prescribe medicine to treat the infection. Follow all instructions for giving this medicine to your child. Use the medicine as instructed every day until it is gone. Dont stop giving it to your child if she feels better. Dont give your child aspirin unless told to by the healthcare provider.  For children ages 2 and up: If your child's healthcare provider says it's OK, you can give acetaminophen or ibuprofen for pain, fever, fussiness, or discomfort. If your child has chronic liver or kidney disease, talk with the healthcare provider before giving these medicines. Also talk with the provider if your child has ever had a stomach ulcer or GI bleeding, or is taking blood thinners.  General care  · Keep track of how often your child urinates. Note the urine color and amount.  · Tell your child to urinate often. Tell her to completely empty the bladder each time. This will help flush out bacteria.  · Have your child wear loose clothes and cotton underwear.  · Make sure that your child drinks enough fluids. Give your child cranberry juice if advised by the healthcare provider.  Prevention  · Make sure your child wipes from front to back after using the toilet. Wipe your baby from front to back during diaper changes.  · Make sure diapers arent tight. If you use cloth diapers, use cotton or wool protectors rather than nylon or rubber pants.   · Change soiled diapers right away.  · Make sure your child  drinks plenty of fluids. Or, make sure your baby feeds often. This is to prevent dehydration.  · Make sure your child urinates when needed, and does not hold it in.  · Dont give your child bubble baths. They can irritate the urethra.  Follow-up care  Follow up with your childs healthcare provider, or as advised. If a culture was done, you will be told of any findings that may affect your child's care.  Call 911  Call 911 if any of these occur:  · Trouble breathing  · Difficulty arousing  · Fainting or loss of consciousness  · Rapid heart rate  · Seizure  When to seek medical advice  Call your child's healthcare provider right away if any of these occur:  · Fever of 100.4°F (38°C) or higher, or as directed by your child's healthcare provider  · Symptoms dont get better after 24 hours of treatment  · Vomiting or inability to keep down medicine  · Pain gets worse  · Pain in the low back, belly, or side  · Foul-smelling urine  · Yellow tint to the skin or eyes (jaundice)  Date Last Reviewed: 10/1/2016  © 2617-3260 [employer]. [address] [place] [address], [place] [address]. All rights reserved. This information is not intended as a substitute for professional medical care. Always follow your healthcare professional's instructions.

## 2022-04-11 NOTE — TELEPHONE ENCOUNTER
Patient called and stated she received a message from her insurance com[pany that stated her surgery was denied and they would not cover it because her stay was not medically necessary at the time to stay in the hospital.     Please advise. Copy was mailed to office according to patient. Letter needs written to be sent in with appeal according to patient.      Routing to Dr. Nirali Zaman

## 2022-04-11 NOTE — PROGRESS NOTES
Return Gyn Office Visit    CC:   Chief Complaint   Patient presents with    Post-Op Check       HPI:  Marcelle Mortensen is a 29 y.o. female who presents for post-op check following MERVIN HICKMAN. Patient is seen and examined today. Patient is doing well today without complaints. Reports bleeding has been just dark brown spotting, one panty liner a day. Reports pain has been well controlled. Had some difficulties with constipation, has started to have bowel movements again and is doing well. Denies concerns with bladder function. Ambulating without difficulty, denies dizziness on standing. Denies chest pain, shortness of breath, fever, chills, nausea, vomiting. Review of Systems - The following ROS was otherwise negative, except as noted in the HPI: constitutional, respiratory, cardiovascular, gastrointestinal, genitourinary    Objective:  /70 (Site: Left Upper Arm, Position: Sitting, Cuff Size: Medium Adult)   Pulse 85   Temp 98.7 °F (37.1 °C) (Infrared)   Wt 145 lb (65.8 kg)   BMI 24.89 kg/m²     Physical Exam  Vitals reviewed. Constitutional:       General: She is not in acute distress. Appearance: She is well-developed. HENT:      Head: Normocephalic and atraumatic. Eyes:      Conjunctiva/sclera: Conjunctivae normal.   Cardiovascular:      Rate and Rhythm: Normal rate. Pulmonary:      Effort: Pulmonary effort is normal. No respiratory distress. Abdominal:      General: There is no distension. Palpations: Abdomen is soft. Tenderness: There is no abdominal tenderness. There is no guarding or rebound. Comments: Incisions healing well without evidence of infection. Musculoskeletal:         General: No swelling. Skin:     General: Skin is warm and dry. Neurological:      Mental Status: She is alert and oriented to person, place, and time. Psychiatric:         Mood and Affect: Mood normal.         Behavior: Behavior normal.         Thought Content:  Thought content normal.       Pathology:   FINAL DIAGNOSIS:     Uterus with bilateral fallopian tubes, simple hysterectomy with bilateral   salpingectomy:   -Scant endometrium with features consistent with prior ablation therapy.   -Unremarkable myometrium.   -Benign unremarkable cervix. -Bilateral unremarkable fallopian tubes.   -No evidence of atypia or malignancy.  MUTGE/MUTGE     Assessment/Plan:     Yumiko Venegas is a 29 y.o. female who presents for post-op check following LAVH, BS.      1. Postoperative examination     - Patient is doing well today without complaints     - Meeting milestones     - Adequate pain control     - Reviewed pathology and surgical images     - Post-operative instructions and precautions reviewed     - Return precautions reviewed     - Will follow-up in 4 weeks for post-op check    2. S/P laparoscopic assisted vaginal hysterectomy (LAVH)    3. Abnormal uterine bleeding (AUB)    4. Dysmenorrhea    5.  Intramural leiomyoma of uterus       Dillon Myers DO

## 2022-04-13 LAB
CHOLESTEROL, TOTAL: 149 MG/DL (ref 0–199)
GLUCOSE BLD-MCNC: 90 MG/DL (ref 70–99)
HDLC SERPL-MCNC: 50 MG/DL (ref 40–60)
LDL CHOLESTEROL CALCULATED: 90 MG/DL
TRIGL SERPL-MCNC: 46 MG/DL (ref 0–150)

## 2022-04-13 NOTE — TELEPHONE ENCOUNTER
it looks like the billing department at the hospital has also received one of these letters. Trinity Dye said  they will be the ones to work on it since it is hospital related.        PEYMAN

## 2022-04-14 ENCOUNTER — CARE COORDINATION (OUTPATIENT)
Dept: OTHER | Facility: CLINIC | Age: 37
End: 2022-04-14

## 2022-04-14 NOTE — CARE COORDINATION
Sneha 45 Transitions Follow Up Call    2022    Patient: Mo Salcido  Patient : 1985   MRN: [id number]  Reason for Admission: LAV  Discharge Date: 22 RARS: Readmission Risk Score: 2.1 ( )         Care Transitions Follow Up Call    Needs to be reviewed by the provider   Additional needs identified to be addressed with provider: No  none             Method of communication with provider : none      Care Transition Nurse (CTN) contacted the patient by telephone to follow up after admission on 3/31/2022 Verified name and  with patient as identifiers. Addressed changes since last contact: Pt completed Post OP visit with surgeon  Discussed follow-up appointments. If no appointment was previously scheduled, appointment scheduling offered: n/a. Is follow up appointment scheduled within 7 days of discharge? No.    Advance Care Planning:   Does patient have an Advance Directive: not on file. CTN reviewed discharge instructions, medical action plan and red flags with patient and discussed any barriers to care and/or understanding of plan of care after discharge. Discussed appropriate site of care based on symptoms and resources available to patient including: Specialist. The patient agrees to contact the PCP office for questions related to their healthcare. Patients top risk factors for readmission: medical condition-recent surgery for [facility]  Interventions to address risk factors: verified attendance to post op visit with surgeon and following discharge instructions  Brief call today with patient. States she is doing great, normal elimination pattern, eating and drinking well, no issues at all she said. Post Op visit completed and went well per patient. Non-[facility] follow up appointment(s): n/a    CTN provided contact information for future needs. Plan for follow-up call in 7-10 days based on severity of symptoms and risk factors.   Plan for next call: symptom review          Care Transitions Subsequent and Final Call    Subsequent and Final Calls  Do you have any ongoing symptoms?: No  Have your medications changed?: No  Do you have any questions related to your medications?: No  Do you currently have any active services?: No  Do you have any needs or concerns that I can assist you with?: No  Identified Barriers: None  Care Transitions Interventions     Other Services:  (Comment: 4/4-sent pt handouts on high fiber and high iron foods; gave pt [facility] financial assistance number)   Other Interventions:            Follow Up  Future Appointments   Date Time Provider Jose Enrique Palma   5/10/2022  2:30 PM DO W[facility] GRISELDA De La Paz RN

## 2022-04-25 ENCOUNTER — CARE COORDINATION (OUTPATIENT)
Dept: OTHER | Facility: CLINIC | Age: 37
End: 2022-04-25

## 2022-04-25 NOTE — CARE COORDINATION
Opened in error.  Pt is followed by another ACM    Erica JEFFRIESN, RN- [facility]  Associate Care Manager  [phone]

## 2022-04-25 NOTE — CARE COORDINATION
[facility] Transitions Follow Up Call    2022    Patient: Sharon Hebert  Patient : 1985    MRN: [id number]  Reason for Admission: S/P laparoscopic assisted vaginal hysterectomy (LAVH)    Discharge Date: 22 RARS: Readmission Risk Score: 2.1 ( )    Spoke with: Patient    Care Transitions Follow Up Call    Needs to be reviewed by the provider   Additional needs identified to be addressed with provider: No  none             Method of communication with provider : none      Care Transition Nurse (CTN) contacted the patient by telephone to follow up after admission on 3/31/22. Verified name and  with patient as identifiers. Addressed changes since last contact: none  Discussed follow-up appointments. If no appointment was previously scheduled, appointment scheduling offered: pt had f/u appt with surgeon on 22. Is follow up appointment scheduled within 7 days of discharge? No.    Pt states she is doing pretty good; pain is 2/10 by the end of the day, when she has been up and about all day. Pt denies need for any pain meds. Patient denies any s/s of infection. Patient instructed on s/s of infection to report including but not limited to fever, chills, foul smelling drainage, increased warmth, redness or hardness around incision sites. Pt states she continues to take dulcolax to assist with bowel movements. ACM educated pt to increase fluid and fiber intake. Pt denies any nausea or vomiting; states appetite is back to normal. Pt reports a small amount of vaginal bleeding; every couple of days, depending on how active she is. Pt reports f/u appt at [facility] office went well and incision sites looked good. Pt reports she will be returning to work tentatively . Pt states that she received an EOB from [facility] that her overnight stay was denied.  ACM discussed the appeal process with pt; pt states that the hospital will be submitting an appeal and instructed her to wait on submitting an appeal.  Pt denies any further questions, needs or concerns; is agreeable to AC follow up outreach. Advance Care Planning:   Does patient have an Advance Directive: on file. CTN reviewed discharge instructions, medical action plan and red flags with patient and discussed any barriers to care and/or understanding of plan of care after discharge. Discussed appropriate site of care based on symptoms and resources available to patient including: PCP  Specialist  Benefits related nurse triage line  When to call [phone]. The patient agrees to contact the PCP office for questions related to their healthcare. Patients top risk factors for readmission: medical condition-/P hysterectomy (LAVH)  Interventions to address risk factors: Scheduled appointment with Specialist-next appt with GYN surgeon is 5/10/22 and Education of patient/family/caregiver/guardian to support self-management-ACM educated pt on red flags and appropriate sites of care, including PCP, specialist, nurse access line, when to go to urgent care/ED and when to call 911 for life threatening emergencies and pt verbalized understanding      Non-[facility] follow up appointment(s): none known    CTN provided contact information for future needs. Plan for follow-up call in 7-10 days based on severity of symptoms and risk factors.   Plan for next call: symptom management-pain, vaginal bleeding, bms  medication management-any new or changed meds      Care Transitions Subsequent and Final Call    Subsequent and Final Calls  Do you have any ongoing symptoms?: No  Have your medications changed?: No  Do you have any questions related to your medications?: No  Do you currently have any active services?: No  Do you have any needs or concerns that I can assist you with?: No  Identified Barriers: None  Care Transitions Interventions     Other Services:  (Comment: 4/4-sent pt handouts on high fiber and high iron foods; gave pt [facility] financial assistance number)   Other Interventions:         Goals      Conditions and Symptoms      I will schedule office visits, as directed by my provider. I will keep my appointment or reschedule if I have to cancel. I will notify my provider of any barriers to my plan of care. I will notify my provider of any symptoms that indicate a worsening of my condition. Barriers: financial and lack of education  Plan for overcoming my barriers: work with ACM  Confidence: 9/10  Anticipated Goal Completion Date: 5/4/22 4/4-pt has f/u with GYN on 4/11 4/25- Pt had f/u appt with GYN on 4/11; next appt 5/10. Follow Up  Future Appointments   Date Time Provider Jose Enrique Palma   5/10/2022  2:30 PM DO WENDY Yancey OB/GY[facility] GRISELDA SALGADO, RN   Ambulatory Care Manager  Associate Care Management  Cell [phone]  [email or web address]

## 2022-05-03 ENCOUNTER — CARE COORDINATION (OUTPATIENT)
Dept: OTHER | Facility: CLINIC | Age: 37
End: 2022-05-03

## 2022-05-03 NOTE — CARE COORDINATION
[facility] Transitions Follow Up Call    5/3/2022    Patient: Radha Casas  Patient : 1985    MRN: [id number]  Reason for Admission: S/P laparoscopic assisted vaginal hysterectomy (LAVH)    Discharge Date: 22 RARS: Readmission Risk Score: 2.1 ( )      Spoke with: Patient    Care Transitions Follow Up Call    Needs to be reviewed by the provider   Additional needs identified to be addressed with provider: No  none             Method of communication with provider : none      Care Transition Nurse (CTN) contacted the patient by telephone to follow up after admission on 3/31/22. Verified name and  with patient as identifiers. Addressed changes since last contact: none  Discussed follow-up appointments. If no appointment was previously scheduled, appointment scheduling offered: Yes and pt has next appt on 5/10. Is follow up appointment scheduled within 7 days of discharge? No.    Pt reports she is doing good; denies any pain. Pt reports she has a small amount of vaginal discharge daily; wearing panty liner. Patient denies any s/s of infection. Patient instructed on s/s of infection to report including but not limited to fever, chills, foul smelling drainage, increased warmth, redness or hardness around incision sites. Pt reports she has healed well. Pt denies any issues with bowel movements. Pt denies any nausea/vomiting and states appetite is back to normal.   Pt denies any further questions, needs or concerns at this time. Since no further care management needs, ACM will sign off. Pt has ACM contact info, if needs or questions should arise in the future. Advance Care Planning:   Does patient have an Advance Directive: on file. CTN reviewed discharge instructions, medical action plan and red flags with patient and discussed any barriers to care and/or understanding of plan of care after discharge.  Discussed appropriate site of care based on symptoms and resources available to patient including: PCP  Specialist  Benefits related nurse triage line  When to call [phone]. The patient agrees to contact the PCP office for questions related to their healthcare. Patients top risk factors for readmission: medical condition-S/P hysterectomy (LAVH)  Interventions to address risk factors: Scheduled appointment with Specialist-next appt with Gyn is 5/10, Education of patient/family/caregiver/guardian to support self-management-ACM educated pt on red flags and appropriate sites of care, including PCP, specialist, nurse access line, when to go to urgent care/ED and when to call 911 for life threatening emergencies and pt verbalized understanding and Assessment and support for treatment adherence and medication management-Medication reconciliation was performed with patient, who verbalizes understanding of administration of home medications. Non-[facility] follow up appointment(s): none known    CTN provided contact information for future needs. No further follow-up call indicated based on severity of symptoms and risk factors. No further outreach scheduled with this ACM, ACM will sign off care team at this time. Episode of Care resolved. Patient has this ACM's contact information if future needs arise. Care Transitions Subsequent and Final Call    Subsequent and Final Calls  Do you have any ongoing symptoms?: No  Have your medications changed?: No  Do you have any questions related to your medications?: No  Do you currently have any active services?: No  Do you have any needs or concerns that I can assist you with?: No  Identified Barriers: None  Care Transitions Interventions  No Identified Needs     Other Services:  (Comment: 4/4-sent pt handouts on high fiber and high iron foods; gave pt [facility] financial assistance number)   Other Interventions:         Goals      Conditions and Symptoms      I will schedule office visits, as directed by my provider.   I will keep my appointment or reschedule if I have to cancel. I will notify my provider of any barriers to my plan of care. I will notify my provider of any symptoms that indicate a worsening of my condition. Barriers: financial and lack of education  Plan for overcoming my barriers: work with ACM  Confidence: 9/10  Anticipated Goal Completion Date: 5/4/22 4/4-pt has f/u with GYN on 4/11 4/25- Pt had f/u appt with GYN on 4/11; next appt 5/10.  5/3- Goal met              Follow Up  Future Appointments   Date Time Provider Jose Enrique Palma   5/10/2022  2:30 PM DO WENDY Hurtado OB/GYN GRISELDA Newberry MSN, RN   Ambulatory Care Manager  Associate Care Management  Cell [phone]  [email or web address]

## 2022-05-10 ENCOUNTER — HOSPITAL ENCOUNTER (OUTPATIENT)
Dept: CT IMAGING | Age: 37
Discharge: HOME | End: 2022-05-10
Payer: COMMERCIAL

## 2022-05-10 ENCOUNTER — OFFICE VISIT (OUTPATIENT)
Dept: OBGYN CLINIC | Age: 37
End: 2022-05-10

## 2022-05-10 VITALS
SYSTOLIC BLOOD PRESSURE: 126 MMHG | WEIGHT: 143.2 LBS | TEMPERATURE: 96.8 F | DIASTOLIC BLOOD PRESSURE: 80 MMHG | BODY MASS INDEX: 24.45 KG/M2 | HEIGHT: 64 IN | HEART RATE: 97 BPM

## 2022-05-10 DIAGNOSIS — Z90.710 S/P LAPAROSCOPIC ASSISTED VAGINAL HYSTERECTOMY (LAVH): ICD-10-CM

## 2022-05-10 DIAGNOSIS — N93.9 VAGINAL BLEEDING: ICD-10-CM

## 2022-05-10 DIAGNOSIS — Z09 POSTOPERATIVE EXAMINATION: Primary | ICD-10-CM

## 2022-05-10 PROCEDURE — 6360000004 HC RX CONTRAST MEDICATION: Performed by: OBSTETRICS & GYNECOLOGY

## 2022-05-10 PROCEDURE — 99024 POSTOP FOLLOW-UP VISIT: CPT | Performed by: OBSTETRICS & GYNECOLOGY

## 2022-05-10 PROCEDURE — 74177 CT ABD & PELVIS W/CONTRAST: CPT

## 2022-05-10 RX ORDER — CEPHALEXIN 500 MG/1
500 CAPSULE ORAL 2 TIMES DAILY
Qty: 14 CAPSULE | Refills: 0 | Status: SHIPPED | OUTPATIENT
Start: 2022-05-10 | End: 2022-05-17

## 2022-05-10 RX ADMIN — IOPAMIDOL 75 ML: 755 INJECTION, SOLUTION INTRAVENOUS at 15:47

## 2022-05-10 NOTE — PROGRESS NOTES
Return Gyn Office Visit    CC:   Chief Complaint   Patient presents with    Follow-up     Vaginal Hysterectomy       HPI:  Alvina Molina is a 40 y.o. female who presents for post op visit following LAVH. Patient is seen and examined today. Patient reports she had some sharp left sided vaginal pain and then dull aching after for the next day. Reports they resolved and she hadn't had any. However, Thursday through Saturday she had a gush of fluid and her pad was full of yellow-brown fluid. Denies chest pain, shortness of breath, fever, chills, nausea, vomiting. Denies urinary frequency, urgency, dysuria or hematuria. Denies concerns with bowel function. Review of Systems - The following ROS was otherwise negative, except as noted in the HPI: constitutional, respiratory, cardiovascular, gastrointestinal, genitourinary    Objective:  /80 (Site: Left Upper Arm, Position: Sitting, Cuff Size: Medium Adult)   Pulse 97   Temp 96.8 °F (36 °C) (Infrared)   Ht 5' 4\" (1.626 m)   Wt 143 lb 3.2 oz (65 kg)   BMI 24.58 kg/m²     Physical Exam  Vitals reviewed. Exam conducted with a chaperone present. Constitutional:       General: She is not in acute distress. Appearance: She is well-developed. HENT:      Head: Normocephalic and atraumatic. Eyes:      Conjunctiva/sclera: Conjunctivae normal.   Cardiovascular:      Rate and Rhythm: Normal rate. Pulmonary:      Effort: Pulmonary effort is normal. No respiratory distress. Abdominal:      General: There is no distension. Palpations: Abdomen is soft. Tenderness: There is no abdominal tenderness. There is no guarding or rebound. Genitourinary:     General: Normal vulva. Exam position: Lithotomy position. Labia:         Right: No rash, tenderness or lesion. Left: No rash, tenderness or lesion. Vagina: No signs of injury and foreign body. Vaginal discharge present.  No erythema, tenderness, bleeding or lesions. Comments: Sutures at top of vaginal cuff dissolving. Bimanual exam reveals intact cuff. Fullness palpable at apex of vaginal cuff. Musculoskeletal:         General: No swelling. Skin:     General: Skin is warm and dry. Neurological:      Mental Status: She is alert and oriented to person, place, and time. Psychiatric:         Mood and Affect: Mood normal.         Behavior: Behavior normal.         Thought Content: Thought content normal.         Assessment/Plan:     Phil Murillo is a 40 y.o. female who presents for post op visit following LAVH. 1. Postoperative examination     - Patient is doing well today without complaints     - Meeting milestones     - Adequate pain control     - Reviewed pathology and surgical images     - Post-operative instructions and precautions reviewed     - Return precautions reviewed     - Will follow-up in 4 weeks for post-op check     2. Vaginal bleeding     - Fullness at apex of vaginal cuff suggestive of seroma vs hematoma     - With vaginal discharge will rule out infectious etiology     - Rx provided for Keflex to prophylax due to suspected hematoma      - Differential reviewed with the patient     - Will order CT scan to evaluate and follow-up with findings    3.  S/P laparoscopic assisted vaginal hysterectomy (LAVH)    Lawrence Smith DO

## 2022-05-11 LAB
CANDIDA SPECIES, DNA PROBE: ABNORMAL
GARDNERELLA VAGINALIS, DNA PROBE: ABNORMAL
TRICHOMONAS VAGINALIS DNA: ABNORMAL

## 2022-06-07 ENCOUNTER — OFFICE VISIT (OUTPATIENT)
Dept: OBGYN CLINIC | Age: 37
End: 2022-06-07

## 2022-06-07 VITALS
BODY MASS INDEX: 24.52 KG/M2 | DIASTOLIC BLOOD PRESSURE: 76 MMHG | WEIGHT: 143.6 LBS | TEMPERATURE: 97.6 F | HEIGHT: 64 IN | HEART RATE: 99 BPM | SYSTOLIC BLOOD PRESSURE: 122 MMHG

## 2022-06-07 DIAGNOSIS — Z90.710 S/P LAPAROSCOPIC ASSISTED VAGINAL HYSTERECTOMY (LAVH): ICD-10-CM

## 2022-06-07 DIAGNOSIS — N89.8: ICD-10-CM

## 2022-06-07 DIAGNOSIS — Z09 POSTOPERATIVE EXAMINATION: Primary | ICD-10-CM

## 2022-06-07 PROCEDURE — 99024 POSTOP FOLLOW-UP VISIT: CPT | Performed by: OBSTETRICS & GYNECOLOGY

## 2022-06-07 NOTE — PROGRESS NOTES
Return Gyn Office Visit    CC:   Chief Complaint   Patient presents with    Follow-up       HPI:  Yumiko Venegas is a 40 y.o. female who presents for post op visit following LAVH. Patient is seen and examined today. Had been dealing with discharge and vaginal bleeding. CT scan showed vaginal cuff hematoma. Reports bleeding and discharge has resolved. Denies pelvic pain. Denies chest pain, shortness of breath, fever, chills, nausea, vomiting. Denies urinary frequency, urgency, dysuria or hematuria. Denies concerns with bowel function. Review of Systems - The following ROS was otherwise negative, except as noted in the HPI: constitutional, respiratory, cardiovascular, gastrointestinal, genitourinary    Objective:  /76 (Site: Right Upper Arm, Position: Sitting, Cuff Size: Medium Adult)   Pulse 99   Temp 97.6 °F (36.4 °C) (Infrared)   Ht 5' 4\" (1.626 m)   Wt 143 lb 9.6 oz (65.1 kg)   LMP 02/25/2022 (Exact Date)   BMI 24.65 kg/m²     Physical Exam  Vitals reviewed. Constitutional:       General: She is not in acute distress. Appearance: She is well-developed. HENT:      Head: Normocephalic and atraumatic. Eyes:      Conjunctiva/sclera: Conjunctivae normal.   Cardiovascular:      Rate and Rhythm: Normal rate. Pulmonary:      Effort: Pulmonary effort is normal. No respiratory distress. Abdominal:      General: There is no distension. Palpations: Abdomen is soft. Tenderness: There is no abdominal tenderness. There is no guarding or rebound. Genitourinary:     Exam position: Lithotomy position. Labia:         Right: No rash, tenderness or lesion. Left: No rash, tenderness or lesion. Vagina: No signs of injury and foreign body. No erythema, tenderness, bleeding or lesions. Musculoskeletal:         General: No swelling. Skin:     General: Skin is warm and dry.    Neurological:      Mental Status: She is alert and oriented to person, place, and time. Psychiatric:         Mood and Affect: Mood normal.         Behavior: Behavior normal.         Thought Content: Thought content normal.         Assessment/Plan:     Elenita Hurtado is a 40 y.o. female who presents for post op visit following LAVH. 1. Postoperative examination     - Patient is doing well today without complaints     - Meeting milestones     - Adequate pain control     - Reviewed pathology and surgical images     - Post-operative instructions and precautions reviewed     - Return precautions reviewed     - Okay to return to work     - Will follow-up for annual exam     2. Vaginal cuff hematoma     - Patient with bleeding and discharge that has since resolved     - CT scan showed 1.2 cm vaginal cuff hematoma     - No complaints today     - Okay to return to work     - Return precautions reviewed     - Will follow-up for annual exam and prn symptoms    3.  S/P laparoscopic assisted vaginal hysterectomy (LAVH)    Loli Holland DO
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
## 2022-12-23 ENCOUNTER — E-VISIT (OUTPATIENT)
Dept: PRIMARY CARE CLINIC | Age: 37
End: 2022-12-23
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
## 2022-12-23 RX ORDER — AMOXICILLIN AND CLAVULANATE POTASSIUM 875; 125 MG/1; MG/1
1 TABLET, FILM COATED ORAL 2 TIMES DAILY
Qty: 20 TABLET | Refills: 0 | Status: SHIPPED | OUTPATIENT
Start: 2022-12-23 | End: 2023-01-02

## 2022-12-23 NOTE — PROGRESS NOTES
Ryanne Pennington (1985) initiated an asynchronous digital communication through [employer]. HPI: per patient questionnaire     Exam: not applicable    Diagnoses and all orders for this visit:  Diagnoses and all orders for this visit:    Cat bite, initial encounter    Other orders  -     amoxicillin-clavulanate (AUGMENTIN) 875-125 MG per tablet; Take 1 tablet by mouth 2 times daily for 10 days    Monitor for s/s of infection  Her kittens. F/u with pcp as needed     Time: EV2 - 11-20 minutes were spent on the digital evaluation and management of this patient.  17 min     Ruchi Quintana, APRN - CNP

## 2023-08-14 ENCOUNTER — TELEPHONE (OUTPATIENT)
Dept: OBGYN CLINIC | Age: 38
End: 2023-08-14

## 2023-08-14 NOTE — TELEPHONE ENCOUNTER
Pt calling with concern for yeast, she reports thick white vaginal discharge and itching. Pt has scheduled her annual. Pended medication Please sign or advise.

## 2023-08-15 RX ORDER — FLUCONAZOLE 150 MG/1
150 TABLET ORAL
Qty: 2 TABLET | Refills: 0 | OUTPATIENT
Start: 2023-08-15 | End: 2023-08-21

## 2023-11-07 ENCOUNTER — OFFICE VISIT (OUTPATIENT)
Dept: OBGYN CLINIC | Age: 38
End: 2023-11-07
Payer: COMMERCIAL

## 2023-11-07 VITALS
SYSTOLIC BLOOD PRESSURE: 110 MMHG | HEART RATE: 94 BPM | TEMPERATURE: 98.1 F | BODY MASS INDEX: 26.47 KG/M2 | WEIGHT: 154.2 LBS | DIASTOLIC BLOOD PRESSURE: 62 MMHG

## 2023-11-07 DIAGNOSIS — Z01.419 ENCNTR FOR GYN EXAM (GENERAL) (ROUTINE) W/O ABN FINDINGS: Primary | ICD-10-CM

## 2023-11-07 DIAGNOSIS — Z87.42 HISTORY OF ABNORMAL CERVICAL PAP SMEAR: ICD-10-CM

## 2023-11-07 PROCEDURE — 99395 PREV VISIT EST AGE 18-39: CPT | Performed by: OBSTETRICS & GYNECOLOGY

## 2023-11-07 ASSESSMENT — ENCOUNTER SYMPTOMS
SHORTNESS OF BREATH: 0
VOMITING: 0
SORE THROAT: 0
NAUSEA: 0
ABDOMINAL PAIN: 0
COUGH: 0
CONSTIPATION: 0
DIARRHEA: 0

## 2023-11-07 NOTE — PROGRESS NOTES
Annual Exam      CC:   Chief Complaint   Patient presents with    Annual Exam       HPI:  41 y.o. [id number] presents for her gynecologic annual exam.    Patient seen and examined. Patient is doing well today     S/p [facility], . Denies bleeding since that time. Denies changes to medical history. Health Maintenance:  Birth control: hysterectomy    Pregnancy plans: None currently   Safe relationship: Yes - together since   Healthy diet: No specific plan, tries to keep balance   Exercise: twice a week    Screening:  Last pap smear: 2021 - NILM  History of abnormal pap smears: Has had intermittent abnormal pap smears, last in 2018 with negative colposcopies following - has never had a positive biopsy. Vaccines:  Gardasil vaccine: Has not had   Flu vaccine: Has had   COVID-19 vaccine: Has had     Review of Systems:   Review of Systems   Constitutional:  Negative for chills and fever. HENT:  Negative for congestion and sore throat. Respiratory:  Negative for cough and shortness of breath. Cardiovascular:  Negative for chest pain and palpitations. Gastrointestinal:  Negative for abdominal pain, constipation, diarrhea, nausea and vomiting. Genitourinary:  Negative for dysuria, frequency, menstrual problem, pelvic pain and vaginal discharge. Musculoskeletal: Negative. Skin: Negative. Neurological: Negative. Negative for headaches. Psychiatric/Behavioral: Negative. All other systems reviewed and are negative.   Breast: Denies skin changes, nipple discharge, lesions, dimpling, tenderness or palpable masses     Primary Care Physician: Tolbert Barthel, MD    Obstetric History  OB History    Para Term  AB Living   3 3 3     3   SAB IAB Ectopic Molar Multiple Live Births             3      # Outcome Date GA Lbr Evni/2nd Weight Sex Delivery Anes PTL Lv   3 Term 14 37w5d 06:04 3.955 kg (8 lb 11.5 oz) M Vag-Spont   LUIS FERNANDO   2 Term 11   3.447 kg (7 lb 9.6 oz) M

## 2024-01-18 ENCOUNTER — PATIENT MESSAGE (OUTPATIENT)
Dept: FAMILY MEDICINE CLINIC | Age: 39
End: 2024-01-18
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
## 2024-01-20 NOTE — TELEPHONE ENCOUNTER
Please offer 2 PM or 4 PM on Thursday 1/25/24.  FYI, has been almost 4 years since I've last seen her.

## 2024-05-29 LAB
CHOLEST SERPL-MCNC: 136 MG/DL (ref 0–199)
GLUCOSE SERPL-MCNC: 76 MG/DL (ref 70–99)
HDLC SERPL-MCNC: 63 MG/DL (ref 40–60)
LDLC SERPL CALC-MCNC: 59 MG/DL
TRIGL SERPL-MCNC: 68 MG/DL (ref 0–150)

## 2024-12-21 ENCOUNTER — OFFICE VISIT (OUTPATIENT)
Dept: URGENT CARE | Age: 39
End: 2024-12-21

## 2024-12-21 VITALS
RESPIRATION RATE: 16 BRPM | SYSTOLIC BLOOD PRESSURE: 108 MMHG | OXYGEN SATURATION: 99 % | WEIGHT: 135 LBS | HEART RATE: 108 BPM | DIASTOLIC BLOOD PRESSURE: 78 MMHG | BODY MASS INDEX: 23.17 KG/M2 | TEMPERATURE: 99.3 F

## 2024-12-21 DIAGNOSIS — R09.81 NASAL CONGESTION: ICD-10-CM

## 2024-12-21 DIAGNOSIS — R05.1 ACUTE COUGH: ICD-10-CM

## 2024-12-21 DIAGNOSIS — R09.82 POSTNASAL DRIP: ICD-10-CM

## 2024-12-21 DIAGNOSIS — R06.02 SOB (SHORTNESS OF BREATH): ICD-10-CM

## 2024-12-21 DIAGNOSIS — J18.9 PNEUMONIA OF RIGHT LOWER LOBE DUE TO INFECTIOUS ORGANISM: Primary | ICD-10-CM

## 2024-12-21 RX ORDER — ONABOTULINUMTOXINA 100 [USP'U]/1
100 INJECTION, POWDER, LYOPHILIZED, FOR SOLUTION INTRADERMAL; INTRAMUSCULAR
COMMUNITY
Start: 2024-10-21

## 2024-12-21 RX ORDER — DEXTROMETHORPHAN HYDROBROMIDE AND PROMETHAZINE HYDROCHLORIDE 15; 6.25 MG/5ML; MG/5ML
5 SYRUP ORAL 4 TIMES DAILY PRN
Qty: 200 ML | Refills: 0 | Status: SHIPPED | OUTPATIENT
Start: 2024-12-21

## 2024-12-21 RX ORDER — SEMAGLUTIDE 1.34 MG/ML
1 INJECTION, SOLUTION SUBCUTANEOUS
COMMUNITY
Start: 2024-12-17

## 2024-12-21 RX ORDER — AZITHROMYCIN 250 MG/1
TABLET, FILM COATED ORAL
Qty: 6 TABLET | Refills: 0 | Status: SHIPPED | OUTPATIENT
Start: 2024-12-21 | End: 2024-12-31

## 2024-12-21 RX ORDER — AMOXICILLIN 500 MG/1
1000 CAPSULE ORAL 3 TIMES DAILY
Qty: 42 CAPSULE | Refills: 0 | Status: SHIPPED | OUTPATIENT
Start: 2024-12-21 | End: 2024-12-28

## 2024-12-21 RX ORDER — GUAIFENESIN 600 MG/1
600 TABLET, EXTENDED RELEASE ORAL 2 TIMES DAILY
Qty: 20 TABLET | Refills: 0 | Status: SHIPPED | OUTPATIENT
Start: 2024-12-21 | End: 2024-12-31

## 2024-12-21 RX ORDER — PSEUDOEPHEDRINE HCL 30 MG/1
30 TABLET, FILM COATED ORAL EVERY 6 HOURS PRN
Qty: 48 TABLET | Refills: 1 | Status: SHIPPED | OUTPATIENT
Start: 2024-12-21

## 2024-12-21 ASSESSMENT — ENCOUNTER SYMPTOMS
VOMITING: 0
VOICE CHANGE: 1
DIARRHEA: 0
RHINORRHEA: 1
CHEST TIGHTNESS: 1
WHEEZING: 0
COUGH: 1
SORE THROAT: 1
NAUSEA: 0
ABDOMINAL PAIN: 0
SHORTNESS OF BREATH: 1

## 2024-12-21 ASSESSMENT — VISUAL ACUITY: OU: 1

## 2024-12-21 NOTE — PATIENT INSTRUCTIONS
Azithromycin is prescribed for antibiotic treatment for the concerns of possible atypical pneumonia  Take the antibiotics until completed, do not stop unless otherwise directed by a healthcare provider.  Recommend daily yogurt or other probiotics while on antibiotics.  Pseudoephedrine prescribed for congestion relief.  Do not take other decongestants while on this medication.  Guaifenesin (Mucinex) prescribed for expectorant therapy.  Promethazine DM prescribed for cough relief.  Do not take other cough medications while on this medication    Recommend OTC treatment for symptoms:  Ibuprofen (Advil, Motrin) and acetaminophen (Tylenol) for fevers and pain relief.  decongestants (specifically pseudoephedrine) <avoid if you have a history of high blood pressure or heart conditions>, along with antihistamines (Claritin, Zyrtec, Allegra, or Xyzal) and nasal steroid sprays (Flonase) to help with nasal congestion and runny nose.  throat sprays (Cepacol, chloraseptic) for throat pain.  dextromethorphan (Robitussin, Delsym), throat lozenges, and increased water intake for cough.  honey (1-2 teaspoons every hour) for relief of throat irritation and coughing fits.  warm teas, humidifiers, nasal lavages, and sleeping in an inclined position are also helpful options that can lessen symptoms.  Recommend warm compresses over the symptomatic ear(s) for 10-15 minutes, or a hot shower, followed by 1-2 minutes of massaging the area behind your ears and down the jaw-line to help with the ear congestion/ear pressure.    Follow up with your PCP within 5 days or, if unable, you can return to the clinic if symptoms persist beyond 5 days or if symptoms worsen.  If you develop shortness of breath, increased work of breathing, lightheadedness, or chest pain, contact 911, or follow up immediately with the nearest Emergency Department for further evaluation.    New Prescriptions    AZITHROMYCIN (ZITHROMAX) 250 MG TABLET    500mg on day 1

## 2024-12-21 NOTE — PROGRESS NOTES
Samara Kerns (: 1985) is a 39 y.o. female, New patient, here for evaluation of the following chief complaint(s):  Generalized Body Aches (Body aches, strep and flu negative at other UC on Wednesday, 103 fevers Thursday, cough, congestion, sx started Monday )      ASSESSMENT/PLAN:    ICD-10-CM    1. Pneumonia of right lower lobe due to infectious organism  J18.9 azithromycin (ZITHROMAX) 250 MG tablet     promethazine-dextromethorphan (PROMETHAZINE-DM) 6.25-15 MG/5ML syrup     amoxicillin (AMOXIL) 500 MG capsule      2. Nasal congestion  R09.81 pseudoephedrine (DECONGESTANT) 30 MG tablet     guaiFENesin (MUCINEX) 600 MG extended release tablet      3. Postnasal drip  R09.82 pseudoephedrine (DECONGESTANT) 30 MG tablet     guaiFENesin (MUCINEX) 600 MG extended release tablet      4. Acute cough  R05.1 XR CHEST PA/LAT     guaiFENesin (MUCINEX) 600 MG extended release tablet     promethazine-dextromethorphan (PROMETHAZINE-DM) 6.25-15 MG/5ML syrup      5. SOB (shortness of breath)  R06.02 XR CHEST PA/LAT          - Pneumonia:   Given symptoms with worsening chest tightness, cough, fevers, and SOB symptoms over the last 5 days in the setting of an underlying viral URI, there is concern for possible pneumonia complicating an underlying viral URI.  Given concerns for pneumonia, x-ray obtained to rule out concerns  Initial independent x-ray impression: bilateral infiltrates present in the lower lung fields  Radiology impression:    Pneumonic infiltrates mostly in the right middle and right lower lobes.  Perihilar congestion.  Pneumonia likely.    Low concern for otitis media, Strep pharyngitis, respiratory distress, bronchitis, and PE.  Mild tachycardia noted on vitals assessment likely secondary to the pt's elevated temperatures/fever.   Azithromycin and Amoxicillin prescribed for antibiotic treatment of the pneumonia infection.  Pseudoephedrine prescribed for congestion relief.  Guaifenesin prescribed for

## 2025-01-31 ENCOUNTER — TELEPHONE (OUTPATIENT)
Dept: WOMENS IMAGING | Age: 40
End: 2025-01-31

## 2025-01-31 DIAGNOSIS — N63.10 MASS OF RIGHT BREAST, UNSPECIFIED QUADRANT: Primary | ICD-10-CM

## 2025-01-31 NOTE — TELEPHONE ENCOUNTER
Dr. Hinojosa,    Patient has a new right breast lump. She is scheduled for diagnostic workup on 2/3/25. Orders pended for you to sign. Dottie Sheriff RN

## 2025-01-31 NOTE — TELEPHONE ENCOUNTER
Signed orders because she is scheduled.  However I have not seen her in over a year and she does need to schedule an office visit for evaluation of this lump.  Thank you.

## 2025-02-03 ENCOUNTER — HOSPITAL ENCOUNTER (OUTPATIENT)
Dept: WOMENS IMAGING | Age: 40
Discharge: HOME | End: 2025-02-03
Payer: COMMERCIAL

## 2025-02-03 ENCOUNTER — TELEPHONE (OUTPATIENT)
Dept: OBGYN | Age: 40
End: 2025-02-03

## 2025-02-03 VITALS — HEIGHT: 64 IN | WEIGHT: 135 LBS | BODY MASS INDEX: 23.05 KG/M2

## 2025-02-03 DIAGNOSIS — R92.8 ABNORMAL MAMMOGRAM: ICD-10-CM

## 2025-02-03 DIAGNOSIS — N63.10 MASS OF RIGHT BREAST, UNSPECIFIED QUADRANT: ICD-10-CM

## 2025-02-03 PROCEDURE — G0279 TOMOSYNTHESIS, MAMMO: HCPCS

## 2025-02-03 PROCEDURE — 76642 ULTRASOUND BREAST LIMITED: CPT

## 2025-02-04 ENCOUNTER — OFFICE VISIT (OUTPATIENT)
Dept: OBGYN CLINIC | Age: 40
End: 2025-02-04
Payer: COMMERCIAL

## 2025-02-04 VITALS
SYSTOLIC BLOOD PRESSURE: 100 MMHG | HEART RATE: 86 BPM | TEMPERATURE: 98 F | DIASTOLIC BLOOD PRESSURE: 64 MMHG | WEIGHT: 138.8 LBS | BODY MASS INDEX: 23.82 KG/M2

## 2025-02-04 DIAGNOSIS — N81.6 BADEN-WALKER GRADE 1 RECTOCELE: Primary | ICD-10-CM

## 2025-02-04 DIAGNOSIS — N63.11 MASS OF UPPER OUTER QUADRANT OF RIGHT BREAST: ICD-10-CM

## 2025-02-04 PROCEDURE — 99213 OFFICE O/P EST LOW 20 MIN: CPT | Performed by: OBSTETRICS & GYNECOLOGY

## 2025-02-04 NOTE — PROGRESS NOTES
There is no abdominal tenderness. There is no guarding or rebound.   Genitourinary:     General: Normal vulva.      Exam position: Lithotomy position.      Labia:         Right: No rash, tenderness or lesion.         Left: No rash, tenderness or lesion.       Vagina: No signs of injury. Prolapsed vaginal walls (Grade 1 rectocele) present. No vaginal discharge, erythema, tenderness, bleeding or lesions.      Comments: Uterus and cervix are surgically absent.  Cuff intact without masses or lesions.   Musculoskeletal:         General: No swelling.   Skin:     General: Skin is warm and dry.   Neurological:      Mental Status: She is alert and oriented to person, place, and time.   Psychiatric:         Mood and Affect: Mood normal.         Behavior: Behavior normal.         Thought Content: Thought content normal.         Assessment/Plan:     Samara Kerns is a 39 y.o. female who presents for evaluation of breast lump as well as possible prolapse.      1. [place]-Walker grade 1 rectocele     - Pressure after straining for a bowel movement     - Exam with grade 1 rectocele     - Differential reviewed     - Risks, benefits and alternatives were reviewed      - Desires to trial pelvic floor physical therapy - referral placed   - [facility]     - Reviewed if worsening symptoms will refer to Urogynecology     - Recommend bowel regimen    2. Mass of upper outer quadrant of right breast     - 1cm, mobile, non-tender lump in the right breast     - Differential reviewed including benign and neoplastic conditions     - Mammogram wnl, however dense breasts     - Will order MRI for further evaluation     - Given palpable lump will refer to breast for evaluation   - Slime Hemphill MD, Breast Surgery, [facility]  - MRI BREAST BILATERAL W WO CONTRAST; Future     - Return precautions reviewed     Aviva Hinojosa DO

## 2025-02-04 NOTE — TELEPHONE ENCOUNTER
Telephone note:       40 y/o  female with concerns regarding vagina.  States this evening she was straining to have a bowel movement and noted a sudden onset of pelvic pressure and protruding parts within her vagina.   Now unable to put finger in vagina.  Feels bulge in area.  Afraid that the vaginal cuff has torn (worried with previous hematoma).  Admits to vaginal discharge.  Denies itching, odor, etc.   Denies bleeding.   No recent sexual activity--x months.   History is positive for LAVH in .  Surgery was complicated by vaginal cuff hematoma.    Discussed potential etiologies for symptoms: unlikely dehiscence of vaginal cuff but not impossible, possible vaginal prolapse, (possible rectal issue), etc.   Will require evaluation to determine what is going on.    Advised patient of options: report to ER if vaginal bleeding, pain, or further symptoms develop versus close follow up in office in AM.   Sending to [facility] to schedule same day visit if patient does not report to ER this evening.   Sending to Dr. Hinojosa as an FYI.   Thanks

## 2025-02-25 ENCOUNTER — HOSPITAL ENCOUNTER (OUTPATIENT)
Dept: MRI IMAGING | Age: 40
Discharge: HOME | End: 2025-02-25
Payer: COMMERCIAL

## 2025-02-25 DIAGNOSIS — N63.11 MASS OF UPPER OUTER QUADRANT OF RIGHT BREAST: ICD-10-CM

## 2025-02-25 PROCEDURE — A9579 GAD-BASE MR CONTRAST NOS,1ML: HCPCS | Performed by: OBSTETRICS & GYNECOLOGY

## 2025-02-25 PROCEDURE — 6360000004 HC RX CONTRAST MEDICATION: Performed by: OBSTETRICS & GYNECOLOGY

## 2025-02-25 PROCEDURE — C8908 MRI W/O FOL W/CONT, BREAST,: HCPCS

## 2025-02-25 RX ADMIN — GADOTERIDOL 12 ML: 279.3 INJECTION, SOLUTION INTRAVENOUS at 15:00

## 2025-03-04 ENCOUNTER — OFFICE VISIT (OUTPATIENT)
Dept: FAMILY MEDICINE CLINIC | Age: 40
End: 2025-03-04
Payer: COMMERCIAL

## 2025-03-04 VITALS
OXYGEN SATURATION: 98 % | BODY MASS INDEX: 24.24 KG/M2 | HEIGHT: 64 IN | SYSTOLIC BLOOD PRESSURE: 102 MMHG | DIASTOLIC BLOOD PRESSURE: 60 MMHG | HEART RATE: 68 BPM | WEIGHT: 142 LBS

## 2025-03-04 DIAGNOSIS — F41.9 ANXIETY: Primary | ICD-10-CM

## 2025-03-04 DIAGNOSIS — Z76.89 ENCOUNTER FOR WEIGHT MANAGEMENT: ICD-10-CM

## 2025-03-04 PROCEDURE — 99214 OFFICE O/P EST MOD 30 MIN: CPT | Performed by: FAMILY MEDICINE

## 2025-03-04 RX ORDER — ONDANSETRON 4 MG/1
TABLET, FILM COATED ORAL
COMMUNITY
Start: 2024-12-18

## 2025-03-04 RX ORDER — SEMAGLUTIDE 1.34 MG/ML
1 INJECTION, SOLUTION SUBCUTANEOUS
Qty: 6 ML | Refills: 0 | Status: SHIPPED | OUTPATIENT
Start: 2025-03-04

## 2025-03-04 RX ORDER — PROPRANOLOL HYDROCHLORIDE 10 MG/1
10 TABLET ORAL DAILY PRN
Qty: 90 TABLET | Refills: 3 | Status: SHIPPED | OUTPATIENT
Start: 2025-03-04

## 2025-03-04 SDOH — ECONOMIC STABILITY: FOOD INSECURITY: WITHIN THE PAST 12 MONTHS, THE FOOD YOU BOUGHT JUST DIDN'T LAST AND YOU DIDN'T HAVE MONEY TO GET MORE.: NEVER TRUE

## 2025-03-04 SDOH — ECONOMIC STABILITY: FOOD INSECURITY: WITHIN THE PAST 12 MONTHS, YOU WORRIED THAT YOUR FOOD WOULD RUN OUT BEFORE YOU GOT MONEY TO BUY MORE.: NEVER TRUE

## 2025-03-04 ASSESSMENT — ANXIETY QUESTIONNAIRES
7. FEELING AFRAID AS IF SOMETHING AWFUL MIGHT HAPPEN: SEVERAL DAYS
5. BEING SO RESTLESS THAT IT IS HARD TO SIT STILL: SEVERAL DAYS
6. BECOMING EASILY ANNOYED OR IRRITABLE: SEVERAL DAYS
GAD7 TOTAL SCORE: 5
1. FEELING NERVOUS, ANXIOUS, OR ON EDGE: SEVERAL DAYS
IF YOU CHECKED OFF ANY PROBLEMS ON THIS QUESTIONNAIRE, HOW DIFFICULT HAVE THESE PROBLEMS MADE IT FOR YOU TO DO YOUR WORK, TAKE CARE OF THINGS AT HOME, OR GET ALONG WITH OTHER PEOPLE: NOT DIFFICULT AT ALL
2. NOT BEING ABLE TO STOP OR CONTROL WORRYING: NOT AT ALL
3. WORRYING TOO MUCH ABOUT DIFFERENT THINGS: NOT AT ALL
4. TROUBLE RELAXING: SEVERAL DAYS

## 2025-03-04 ASSESSMENT — PATIENT HEALTH QUESTIONNAIRE - PHQ9
6. FEELING BAD ABOUT YOURSELF - OR THAT YOU ARE A FAILURE OR HAVE LET YOURSELF OR YOUR FAMILY DOWN: NOT AT ALL
9. THOUGHTS THAT YOU WOULD BE BETTER OFF DEAD, OR OF HURTING YOURSELF: NOT AT ALL
10. IF YOU CHECKED OFF ANY PROBLEMS, HOW DIFFICULT HAVE THESE PROBLEMS MADE IT FOR YOU TO DO YOUR WORK, TAKE CARE OF THINGS AT HOME, OR GET ALONG WITH OTHER PEOPLE: NOT DIFFICULT AT ALL
SUM OF ALL RESPONSES TO PHQ QUESTIONS 1-9: 2
4. FEELING TIRED OR HAVING LITTLE ENERGY: SEVERAL DAYS
SUM OF ALL RESPONSES TO PHQ QUESTIONS 1-9: 2
3. TROUBLE FALLING OR STAYING ASLEEP: SEVERAL DAYS
2. FEELING DOWN, DEPRESSED OR HOPELESS: NOT AT ALL
1. LITTLE INTEREST OR PLEASURE IN DOING THINGS: NOT AT ALL
8. MOVING OR SPEAKING SO SLOWLY THAT OTHER PEOPLE COULD HAVE NOTICED. OR THE OPPOSITE, BEING SO FIGETY OR RESTLESS THAT YOU HAVE BEEN MOVING AROUND A LOT MORE THAN USUAL: NOT AT ALL
7. TROUBLE CONCENTRATING ON THINGS, SUCH AS READING THE NEWSPAPER OR WATCHING TELEVISION: NOT AT ALL
5. POOR APPETITE OR OVEREATING: NOT AT ALL

## 2025-03-04 NOTE — PROGRESS NOTES
03/06/2025 08:38 AM    MCV 94.4 03/06/2025 08:38 AM     03/06/2025 08:38 AM     Lab Results   Component Value Date/Time    CHOL 152 03/07/2025 07:00 AM    TRIG 59 03/07/2025 07:00 AM    HDL 63 03/07/2025 07:00 AM     Lab Results   Component Value Date    TSH 2.59 10/02/2018    TSHFT4 2.33 03/06/2025             The patient (or guardian, if applicable) and other individuals in attendance with the patient were advised that Artificial Intelligence will be utilized during this visit to record, process the conversation to generate a clinical note, and support improvement of the AI technology. The patient (or guardian, if applicable) and other individuals in attendance at the appointment consented to the use of AI, including the recording.      An electronic signature was used to authenticate this note.    --Valorie Linares MD

## 2025-03-06 ENCOUNTER — HOSPITAL ENCOUNTER (OUTPATIENT)
Age: 40
Discharge: HOME | End: 2025-03-06
Payer: COMMERCIAL

## 2025-03-06 ENCOUNTER — PATIENT MESSAGE (OUTPATIENT)
Dept: OBGYN CLINIC | Age: 40
End: 2025-03-06

## 2025-03-06 DIAGNOSIS — Z76.89 ENCOUNTER FOR WEIGHT MANAGEMENT: ICD-10-CM

## 2025-03-06 LAB
ALBUMIN SERPL-MCNC: 4.3 G/DL (ref 3.4–5)
ALBUMIN/GLOB SERPL: 1.9 {RATIO} (ref 1.1–2.2)
ALP SERPL-CCNC: 74 U/L (ref 40–129)
ALT SERPL-CCNC: 21 U/L (ref 10–40)
ANION GAP SERPL CALCULATED.3IONS-SCNC: 6 MMOL/L (ref 3–16)
AST SERPL-CCNC: 20 U/L (ref 15–37)
BASOPHILS # BLD: 0.1 K/UL (ref 0–0.2)
BASOPHILS NFR BLD: 0.8 %
BILIRUB SERPL-MCNC: <0.2 MG/DL (ref 0–1)
BUN SERPL-MCNC: 10 MG/DL (ref 7–20)
CALCIUM SERPL-MCNC: 9 MG/DL (ref 8.3–10.6)
CHLORIDE SERPL-SCNC: 103 MMOL/L (ref 99–110)
CO2 SERPL-SCNC: 29 MMOL/L (ref 21–32)
CREAT SERPL-MCNC: 0.9 MG/DL (ref 0.6–1.1)
DEPRECATED RDW RBC AUTO: 13.3 % (ref 12.4–15.4)
EOSINOPHIL # BLD: 0.2 K/UL (ref 0–0.6)
EOSINOPHIL NFR BLD: 2.3 %
EST. AVERAGE GLUCOSE BLD GHB EST-MCNC: 93.9 MG/DL
GFR SERPLBLD CREATININE-BSD FMLA CKD-EPI: 83 ML/MIN/{1.73_M2}
GLUCOSE SERPL-MCNC: 73 MG/DL (ref 70–99)
HBA1C MFR BLD: 4.9 %
HCT VFR BLD AUTO: 39.6 % (ref 36–48)
HGB BLD-MCNC: 13.5 G/DL (ref 12–16)
LYMPHOCYTES # BLD: 1.9 K/UL (ref 1–5.1)
LYMPHOCYTES NFR BLD: 22.5 %
MCH RBC QN AUTO: 32.2 PG (ref 26–34)
MCHC RBC AUTO-ENTMCNC: 34.1 G/DL (ref 31–36)
MCV RBC AUTO: 94.4 FL (ref 80–100)
MONOCYTES # BLD: 0.7 K/UL (ref 0–1.3)
MONOCYTES NFR BLD: 8.2 %
NEUTROPHILS # BLD: 5.5 K/UL (ref 1.7–7.7)
NEUTROPHILS NFR BLD: 66.2 %
PLATELET # BLD AUTO: 288 K/UL (ref 135–450)
PMV BLD AUTO: 9.1 FL (ref 5–10.5)
POTASSIUM SERPL-SCNC: 4.2 MMOL/L (ref 3.5–5.1)
PROT SERPL-MCNC: 6.6 G/DL (ref 6.4–8.2)
RBC # BLD AUTO: 4.19 M/UL (ref 4–5.2)
SODIUM SERPL-SCNC: 138 MMOL/L (ref 136–145)
TSH SERPL DL<=0.005 MIU/L-ACNC: 2.33 UIU/ML (ref 0.27–4.2)
WBC # BLD AUTO: 8.3 K/UL (ref 4–11)

## 2025-03-06 PROCEDURE — 80053 COMPREHEN METABOLIC PANEL: CPT

## 2025-03-06 PROCEDURE — 85025 COMPLETE CBC W/AUTO DIFF WBC: CPT

## 2025-03-06 PROCEDURE — 83036 HEMOGLOBIN GLYCOSYLATED A1C: CPT

## 2025-03-06 PROCEDURE — 36415 COLL VENOUS BLD VENIPUNCTURE: CPT

## 2025-03-06 PROCEDURE — 84443 ASSAY THYROID STIM HORMONE: CPT

## 2025-03-06 NOTE — TELEPHONE ENCOUNTER
We can definitely change the referral for the [place] Location if they have an earlier availability there.  Unfortunately there is a benefit for seeing a specialist and then letting her refer you to the exercises to do at home.  There are things that you can find online, but a physical therapist has the ability to tell you which exercises will benefit you the most.  Thank you.

## 2025-03-07 LAB
CHOLEST SERPL-MCNC: 152 MG/DL (ref 0–199)
GLUCOSE SERPL-MCNC: 77 MG/DL (ref 70–99)
HDLC SERPL-MCNC: 63 MG/DL (ref 40–60)
LDLC SERPL CALC-MCNC: 77 MG/DL
TRIGL SERPL-MCNC: 59 MG/DL (ref 0–150)

## 2025-03-08 ENCOUNTER — RESULTS FOLLOW-UP (OUTPATIENT)
Dept: FAMILY MEDICINE CLINIC | Age: 40
End: 2025-03-08

## 2025-03-08 PROBLEM — D25.1 INTRAMURAL LEIOMYOMA OF UTERUS: Status: RESOLVED | Noted: 2022-03-24 | Resolved: 2025-03-08

## 2025-03-08 PROBLEM — N93.9 ABNORMAL UTERINE BLEEDING (AUB): Status: RESOLVED | Noted: 2021-12-08 | Resolved: 2025-03-08

## 2025-03-08 PROBLEM — F41.9 ANXIETY: Status: ACTIVE | Noted: 2025-03-08

## 2025-03-08 PROBLEM — Z76.89 ENCOUNTER FOR WEIGHT MANAGEMENT: Status: ACTIVE | Noted: 2025-03-08

## 2025-03-08 PROBLEM — N94.6 DYSMENORRHEA: Status: RESOLVED | Noted: 2021-12-08 | Resolved: 2025-03-08

## 2025-03-08 ASSESSMENT — ENCOUNTER SYMPTOMS
ABDOMINAL PAIN: 0
NAUSEA: 0
VOMITING: 0
DIARRHEA: 0
CONSTIPATION: 0

## 2025-03-08 NOTE — ASSESSMENT & PLAN NOTE
Chronic, at goal (stable), continue current plan pending work up below  - On Ozempic since March 2024, helping maintain weight   - Ozempic may not be covered by insurance as she does not have diabetes, which was discussed today  - Discussed Wegovy as a potential alternative, but it is a plan exclusion  - Additional blood work, including lipid, thyroid and renal panel, to be ordered to monitor medication    Orders:    Semaglutide, 1 MG/DOSE, (OZEMPIC, 1 MG/DOSE,) 4 MG/3ML SOPN sc injection; Inject 1 mg into the skin every 7 days    Comprehensive Metabolic Panel; Future    TSH reflex to FT4; Future    Hemoglobin A1C; Future    CBC with Auto Differential; Future

## 2025-03-08 NOTE — ASSESSMENT & PLAN NOTE
New, not at goal (unstable), plan as follows:  - Reports experiencing anxiety 3 to 4 times a week, feeling overwhelmed due to responsibilities   - Counseling recommended  - Informed about [facility] offering 3 free sessions with a therapist  - Explained difference between daily medications and as-needed medications  - Discussed Hydroxyzine and propranolol as potential as-needed medications  - Chose propranolol   - Explained potential benefits of propranolol, including control of physical symptoms of anxiety and long-term prevention of PTSD-type symptoms  - Prescription for propranolol sent to [facility]  - If as-needed medication proves ineffective, will consider daily serotonin-type medication such as Zoloft, Prozac, or Lexapro  - Will check labs below for further assessment as well, notably checking thyroid.     Orders:    propranolol (INDERAL) 10 MG tablet; Take 1 tablet by mouth daily as needed (anxiety)    Comprehensive Metabolic Panel; Future    TSH reflex to FT4; Future    CBC with Auto Differential; Future

## 2025-04-10 ENCOUNTER — HOSPITAL ENCOUNTER (OUTPATIENT)
Dept: PHYSICAL THERAPY | Age: 40
Setting detail: THERAPIES SERIES
Discharge: HOME | End: 2025-04-10
Attending: OBSTETRICS & GYNECOLOGY
Payer: COMMERCIAL

## 2025-04-10 DIAGNOSIS — N81.89 PELVIC FLOOR WEAKNESS IN FEMALE: Primary | ICD-10-CM

## 2025-04-10 DIAGNOSIS — R19.8 ABDOMINAL WEAKNESS: ICD-10-CM

## 2025-04-10 PROCEDURE — 97161 PT EVAL LOW COMPLEX 20 MIN: CPT

## 2025-04-10 PROCEDURE — 97530 THERAPEUTIC ACTIVITIES: CPT

## 2025-04-10 PROCEDURE — 97110 THERAPEUTIC EXERCISES: CPT

## 2025-04-10 NOTE — PLAN OF CARE
scheduled/recommended follow up visits, this note will serve as a discharge from care along with the most recent update on progress.    Pelvic Floor Evaluation

## 2025-04-24 ENCOUNTER — HOSPITAL ENCOUNTER (OUTPATIENT)
Dept: PHYSICAL THERAPY | Age: 40
Setting detail: THERAPIES SERIES
Discharge: HOME | End: 2025-04-24
Attending: OBSTETRICS & GYNECOLOGY
Payer: COMMERCIAL

## 2025-04-24 PROCEDURE — 97110 THERAPEUTIC EXERCISES: CPT

## 2025-04-24 NOTE — FLOWSHEET NOTE
[facility] - Outpatient Rehabilitation and Therapy: [address] [place] [address] office: [phone] fax: [phone]         Physical Therapy: TREATMENT/PROGRESS NOTE   Patient: Samara Kerns (40 y.o. female)   Examination Date: 2025   :  1985 MRN: [id number]   Visit #: 2   Insurance Allowable Auth Needed   30 []Yes    [x]No    Insurance: Payor: [facility] / Plan: [employer] EMPL[employer]YEES / Product Type: *No Product type* /   Insurance ID: [id number] - (Commercial)  Secondary Insurance (if applicable):    Treatment Diagnosis:     ICD-10-CM    1. Pelvic floor weakness in female  N81.89       2. Abdominal weakness  R19.8          Medical Diagnosis:[place]-Walker grade 1 rectocele [N81.6]   Referring Physician: Aviva Hinojosa DO  PCP: Valorie Linares MD   Plan of care signed (Y/N): YES    Date of Patient follow up with Physician: 25     Plan of Care Report: NO  POC update due: (10 visits /OR AUTH LIMITS, whichever is less)  2025                                             Medical History:  Comorbidities:  None  Relevant Medical History: see chart, updated with patient.                                          Precautions/ Contra-indications:           Latex allergy:  NO  Pacemaker:    NO  Contraindications for Manipulation: None    Red Flags:  None    Suicide Screening:   The patient did not verbalize a primary behavioral concern, suicidal ideation, suicidal intent, or demonstrate suicidal behaviors.    Preferred Language for Healthcare:   [x] English       [] other:    Chaperone for Intimate Exam  Chaperone was offered as part of the rooming process. Patient declined and agrees to continue with exam without a chaperone.  Chaperone: n/a     SUBJECTIVE EXAMINATION     Patient stated complaint/comments: Patient reports no new complaints.  Reports compliance with HEP.     From Eval: Patient reports \"I hadn't gone to the bathroom in a few day and I tried to go and I was

## 2025-04-29 ENCOUNTER — OFFICE VISIT (OUTPATIENT)
Dept: OBGYN CLINIC | Age: 40
End: 2025-04-29
Payer: COMMERCIAL

## 2025-04-29 VITALS
DIASTOLIC BLOOD PRESSURE: 60 MMHG | HEART RATE: 68 BPM | BODY MASS INDEX: 24.72 KG/M2 | WEIGHT: 144 LBS | OXYGEN SATURATION: 98 % | SYSTOLIC BLOOD PRESSURE: 108 MMHG

## 2025-04-29 DIAGNOSIS — Z87.42 HISTORY OF ABNORMAL CERVICAL PAP SMEAR: ICD-10-CM

## 2025-04-29 DIAGNOSIS — N63.11 MASS OF UPPER OUTER QUADRANT OF RIGHT BREAST: ICD-10-CM

## 2025-04-29 DIAGNOSIS — Z01.419 ENCNTR FOR GYN EXAM (GENERAL) (ROUTINE) W/O ABN FINDINGS: Primary | ICD-10-CM

## 2025-04-29 PROCEDURE — 99395 PREV VISIT EST AGE 18-39: CPT | Performed by: OBSTETRICS & GYNECOLOGY

## 2025-04-29 NOTE — PROGRESS NOTES
Annual Exam      CC:   Chief Complaint   Patient presents with    Annual Exam     3 month followup       HPI:  40 y.o.  presents for her gynecologic annual exam.    Patient seen and examined. Patient is doing well today     S/p LAV, .  Denies bleeding since that time.   Has been doing pelvic floor PT and is doing well.     Denies changes to medical history.     Health Maintenance:  Birth control: hysterectomy    Pregnancy plans: None currently   Safe relationship: Yes - together since   Healthy diet: No specific plan, tries to keep balance   Exercise: twice a week    Screening:  Last pap smear: 2021 - NILM  History of abnormal pap smears: Has had intermittent abnormal pap smears, last in 2018 with negative colposcopies following - has never had a positive biopsy.     Vaccines:  Gardasil vaccine: Has not had   Flu vaccine: Has had   COVID-19 vaccine: Has had series, no boosters    Review of Systems:   Review of Systems   Constitutional:  Negative for chills and fever.   HENT:  Negative for congestion and sore throat.    Respiratory:  Negative for cough and shortness of breath.    Cardiovascular:  Negative for chest pain and palpitations.   Gastrointestinal:  Negative for abdominal pain, constipation, diarrhea, nausea and vomiting.   Genitourinary:  Negative for dysuria, frequency, menstrual problem, pelvic pain and vaginal discharge.   Musculoskeletal: Negative.    Skin: Negative.    Neurological: Negative.  Negative for headaches.   Psychiatric/Behavioral: Negative.     All other systems reviewed and are negative.  Breast: Denies skin changes, nipple discharge, lesions, dimpling, tenderness or palpable masses     Primary Care Physician: Valorie Linares MD    Obstetric History  OB History    Para Term  AB Living   3 3 3   3   SAB IAB Ectopic Molar Multiple Live Births        3      # Outcome Date GA Lbr Evin/2nd Weight Sex Type Anes PTL Lv   3 Term 14 37w5d 06:04 3.955 kg (8 lb

## 2025-05-08 ENCOUNTER — HOSPITAL ENCOUNTER (OUTPATIENT)
Dept: PHYSICAL THERAPY | Age: 40
Setting detail: THERAPIES SERIES
Discharge: HOME | End: 2025-05-08
Attending: OBSTETRICS & GYNECOLOGY
Payer: COMMERCIAL

## 2025-05-08 PROCEDURE — 97530 THERAPEUTIC ACTIVITIES: CPT

## 2025-05-08 PROCEDURE — 97110 THERAPEUTIC EXERCISES: CPT

## 2025-05-08 NOTE — FLOWSHEET NOTE
[facility] - Outpatient Rehabilitation and Therapy: [address] [place] [address] office: [phone] fax: [phone]         Physical Therapy: TREATMENT/PROGRESS NOTE   Patient: Samara Kerns (40 y.o. female)   Examination Date: 2025   :  1985 MRN: [id number]   Visit #: 3   Insurance Allowable Auth Needed   30 []Yes    [x]No    Insurance: Payor: [facility] / Plan: [employer] [facility] EMPLOYEES / Product Type: *No Product type* /   Insurance ID: [id number] - (Commercial)  Secondary Insurance (if applicable):    Treatment Diagnosis:     ICD-10-CM    1. Pelvic floor weakness in female  N81.89       2. Abdominal weakness  R19.8          Medical Diagnosis:[place]-Walker grade 1 rectocele [N81.6]   Referring Physician: Aviva Hinojosa DO  PCP: Valorie Linares MD   Plan of care signed (Y/N): YES    Date of Patient follow up with Physician: 25     Plan of Care Report: NO  POC update due: (10 visits /OR AUTH LIMITS, whichever is less)  2025                                             Medical History:  Comorbidities:  None  Relevant Medical History: see chart, updated with patient.                                          Precautions/ Contra-indications:           Latex allergy:  NO  Pacemaker:    NO  Contraindications for Manipulation: None    Red Flags:  None    Suicide Screening:   The patient did not verbalize a primary behavioral concern, suicidal ideation, suicidal intent, or demonstrate suicidal behaviors.    Preferred Language for Healthcare:   [x] English       [] other:    Chaperone for Intimate Exam  Chaperone was offered as part of the rooming process. Patient declined and agrees to continue with exam without a chaperone.  Chaperone: n/a     SUBJECTIVE EXAMINATION     Patient stated complaint/comments: Patient reports she went to see the MD last week, no new reports. Reports compliance with HEP.     From Eval: Patient reports \"I hadn't gone to the bathroom in a few day

## 2025-05-22 ENCOUNTER — APPOINTMENT (OUTPATIENT)
Dept: PHYSICAL THERAPY | Age: 40
End: 2025-05-22
Attending: OBSTETRICS & GYNECOLOGY
Payer: COMMERCIAL

## 2025-05-28 ENCOUNTER — E-VISIT (OUTPATIENT)
Dept: FAMILY MEDICINE CLINIC | Age: 40
End: 2025-05-28
Payer: COMMERCIAL

## 2025-05-28 DIAGNOSIS — J01.90 ACUTE NON-RECURRENT SINUSITIS, UNSPECIFIED LOCATION: Primary | ICD-10-CM

## 2025-05-28 PROCEDURE — 99421 OL DIG E/M SVC 5-10 MIN: CPT | Performed by: FAMILY MEDICINE

## 2025-05-28 ASSESSMENT — LIFESTYLE VARIABLES: SMOKING_STATUS: NO, I'VE NEVER SMOKED

## 2025-05-29 RX ORDER — BENZONATATE 100 MG/1
100 CAPSULE ORAL 3 TIMES DAILY PRN
Qty: 30 CAPSULE | Refills: 0 | Status: SHIPPED | OUTPATIENT
Start: 2025-05-29 | End: 2025-06-08

## 2025-05-29 NOTE — PROGRESS NOTES
HPI: as per patient provided history  Exam: N/A (electronic visit)  ASSESSMENT/PLAN:  1. Acute non-recurrent sinusitis, unspecified location  - amoxicillin-clavulanate (AUGMENTIN) 875-125 MG per tablet; Take 1 tablet by mouth 2 times daily for 7 days  Dispense: 14 tablet; Refill: 0  - benzonatate (TESSALON) 100 MG capsule; Take 1 capsule by mouth 3 times daily as needed for Cough  Dispense: 30 capsule; Refill: 0       Patient instructed to call the office if worsens, or fails to improve as anticipated.       5 minutes were spent on the digital evaluation and management of this patient.

## 2025-06-17 DIAGNOSIS — Z76.89 ENCOUNTER FOR WEIGHT MANAGEMENT: ICD-10-CM

## 2025-06-17 RX ORDER — SEMAGLUTIDE 1.34 MG/ML
1 INJECTION, SOLUTION SUBCUTANEOUS
Qty: 6 ML | Refills: 0 | Status: SHIPPED | OUTPATIENT
Start: 2025-06-17

## 2025-06-17 NOTE — TELEPHONE ENCOUNTER
Refill Request     CONFIRM preferred pharmacy with the patient.    If Mail Order Rx - Pend for 90 day refill.      Last Seen: Last Seen Department: 5/28/2025  Last Seen by PCP: 5/28/2025    Last Written: 3/4/25 6 ml with no refills     If no future appointment scheduled:  Review the last OV with PCP and review information for follow-up visit,  Route STAFF MESSAGE with patient name to the  Pool for scheduling with the following information:            -  Timing of next visit           -  Visit type ie Physical, OV, etc           -  Diagnoses/Reason ie. COPD, HTN - Do not use MEDICATION, Follow-up or CHECK UP - Give reason for visit      Next Appointment:   No future appointments.    Message sent to  to schedule appt with patient?  NO      Requested Prescriptions     Pending Prescriptions Disp Refills    Semaglutide, 1 MG/DOSE, (OZEMPIC, 1 MG/DOSE,) 4 MG/3ML SOPN sc injection 6 mL 0     Sig: Inject 1 mg into the skin every 7 days

## 2025-07-22 ENCOUNTER — PATIENT MESSAGE (OUTPATIENT)
Dept: FAMILY MEDICINE CLINIC | Age: 40
End: 2025-07-22

## 2025-07-22 DIAGNOSIS — Z76.89 ENCOUNTER FOR WEIGHT MANAGEMENT: ICD-10-CM

## 2025-07-22 NOTE — TELEPHONE ENCOUNTER
Spoke with Armando.  Armando stated [facility] pulled the script already and to call them.  Called mail order.  Per the pharmacist, the previous approval is currently in the process of being revoked as patient does not have Type 2 Diabetes.    Spoke with patient.  Patient reports Dr. Linares took over filling the script so that it could be sent to [facility].  Patient stated the previous provider is the one that obtained the approval.  Advised patient that the providers are no longer ordering the compounded medications and that they are sending it to the [facility]'s pharmacy for self pay, gave patient the self pay prices.  Advised patient to reach out to the previous ordering provider to see how they were able to get it approved.  Confirmed with patient that [employer] insurance does not cover any of the GLP-1 medications for weight loss.  Patient stated understanding.

## (undated) DEVICE — MINOR SET UP PK

## (undated) DEVICE — NEEDLE,22GX1.5",REG,BEVEL: Brand: MEDLINE

## (undated) DEVICE — TROCAR: Brand: KII SLEEVE

## (undated) DEVICE — APPLIER CLP M/L SHFT DIA5MM 15 LIG LIGAMAX 5

## (undated) DEVICE — AGENT HEMSTAT 3GM OXIDIZED REGENERATED CELOS ABSRB FOR CONT (ORDER MULTIPLES OF 5EA)

## (undated) DEVICE — SHEET,DRAPE,53X77,STERILE: Brand: MEDLINE

## (undated) DEVICE — NEEDLE ECHOGENIC 20GA L4IN INSUL W/ 30DEG BVL EXTN SET

## (undated) DEVICE — GLOVE SURG SZ 6 THK91MIL LTX FREE SYN POLYISOPRENE ANTI

## (undated) DEVICE — TUBING, SUCTION, 1/4" X 6', SCALLOP: Brand: MEDLINE

## (undated) DEVICE — INTENDED FOR TISSUE SEPARATION, AND OTHER PROCEDURES THAT REQUIRE A SHARP SURGICAL BLADE TO PUNCTURE OR CUT.: Brand: BARD-PARKER ® STAINLESS STEEL BLADES

## (undated) DEVICE — GOWN,SIRUS,NON REINFRCD,LARGE,SET IN SL: Brand: MEDLINE

## (undated) DEVICE — 3000CC HIFLOW SUCTION CANISTER WITH AEROSTAT FILTER, FLOAT VALVE SHUT OFF WITH GREEN LID: Brand: BEMIS

## (undated) DEVICE — SEALER LAP L37CM MARYLAND JAW OPN NANO COAT MULTIFUNCTIONAL

## (undated) DEVICE — GAUZE,SPONGE,2"X2",3PLY,NS,LF: Brand: MEDLINE

## (undated) DEVICE — SYRINGE 60ML BULB IRRIG ST LF

## (undated) DEVICE — [HIGH FLOW INSUFFLATOR,  DO NOT USE IF PACKAGE IS DAMAGED,  KEEP DRY,  KEEP AWAY FROM SUNLIGHT,  PROTECT FROM HEAT AND RADIOACTIVE SOURCES.]: Brand: PNEUMOSURE

## (undated) DEVICE — PAD TBL OP RM TRENDELENBURG STATIC TORSO W/STRAPS

## (undated) DEVICE — SOLUTION IV 1000ML LAC RINGERS PH 6.5 INJ USP VIAFLX PLAS

## (undated) DEVICE — SET FLD MGMT CTRL SYS INFLO TB AQUILEX

## (undated) DEVICE — TRAY PREP DRY W/ PREM GLV 2 APPL 6 SPNG 2 UNDPD 1 OVERWRAP

## (undated) DEVICE — QUILTED PREMIUM COMFORT UNDERPAD,EXTRA HEAVY: Brand: WINGS

## (undated) DEVICE — Z CONVERTED USE 2271320 CANISTER SUCT ACC ADPT SPEC COLL

## (undated) DEVICE — GLOVE,SURG,SENSICARE,ALOE,LF,PF,7: Brand: MEDLINE

## (undated) DEVICE — SUTURE VCRL SZ 0 L36IN ABSRB UD CT-1 L36MM 1/2 CIR TAPR PNT VCP946H

## (undated) DEVICE — TROCAR: Brand: KII FIOS FIRST ENTRY

## (undated) DEVICE — SYRINGE IRRIG 60ML SFT PLIABLE BLB EZ TO GRP 1 HND USE W/

## (undated) DEVICE — KIT THERMOABLATION 6MM ENDOMET DEV NOVASURE

## (undated) DEVICE — PACK PROCEDURE SURG GYN LAP CDS

## (undated) DEVICE — SURGICEL ENDOSCP APPL

## (undated) DEVICE — SPONGE,LAP,4"X18",XR,ST,5/PK,40PK/CS: Brand: MEDLINE INDUSTRIES, INC.

## (undated) DEVICE — GLOVE SURG SZ 65 THK91MIL LTX FREE SYN POLYISOPRENE

## (undated) DEVICE — ADHESIVE LIQ COMPOUND TINC BENZ AMPULE

## (undated) DEVICE — SOLUTION IV IRRIG 500ML 0.9% SODIUM CHL 2F7123

## (undated) DEVICE — INVIEW CLEAR LEGGINGS: Brand: CONVERTORS

## (undated) DEVICE — GLOVE SURG SZ 65 L12IN FNGR THK94MIL STD WHT LTX FREE

## (undated) DEVICE — SET FLD MGMT OUTFLO TB DISP FOR CTRL SYS AQUILEX

## (undated) DEVICE — SET ENDOSCP SEAL HYSTEROSCOPE RIG OUTFLO CHN DISP MYOSURE

## (undated) DEVICE — 3M™ STERI-STRIP™ REINFORCED ADHESIVE SKIN CLOSURES, R1547, 1/2 IN X 4 IN (12 MM X 100 MM), 6 STRIPS/ENVELOPE: Brand: 3M™ STERI-STRIP™

## (undated) DEVICE — DRAPE,UNDERBUTTOCKS,PCH,STERILE: Brand: MEDLINE

## (undated) DEVICE — LAP SPONGE  18 X 18 IN. PREWASHED SOFTPACK X-RAY DETECTABLE: Brand: CARDINAL HEALTH

## (undated) DEVICE — SET ADMIN PRIMING 7ML L30IN 7.35LB 20 GTT 2ND RLER CLMP

## (undated) DEVICE — PVC URETHRAL CATHETER: Brand: DOVER

## (undated) DEVICE — DRESSING TEGADERM CLR ACRYLIC OVAL SM 3X3.75IN

## (undated) DEVICE — ELECTRODE,ECG,STRESS,FOAM,3PK: Brand: MEDLINE

## (undated) DEVICE — PENCIL ES L3M ROCK SWCH S STL HEX LOK BLDE ELECTRD HOLSTER

## (undated) DEVICE — CATHETER IV 20GA L1.25IN PNK FEP SFTY STR HUB RADPQ DISP

## (undated) DEVICE — 3M™ TEGADERM™ TRANSPARENT FILM DRESSING FRAME STYLE, 1624W, 2-3/8 IN X 2-3/4 IN (6 CM X 7 CM), 100/CT 4CT/CASE: Brand: 3M™ TEGADERM™

## (undated) DEVICE — TIP SUCT STD FLNG RIG RIB 5IN1 CONN NVENT W/ SECUR GRP HNDL

## (undated) DEVICE — TELFA NON-ADHERENT ABSORBENT DRESSING: Brand: TELFA

## (undated) DEVICE — TRAY CATH CATH OD16FR 200ML URIN M SIL CNTR ENTRY F

## (undated) DEVICE — PUMP SUC IRR TBNG L10FT W/ HNDPC ASSEMB STRYKEFLOW 2

## (undated) DEVICE — CURITY IDOFORM PACKING STRIP: Brand: CURITY

## (undated) DEVICE — SYRINGE BLB 50CC IRRIG PLIABLE FNGR FLNG GRAD FLSK DISP

## (undated) DEVICE — SUTURE MCRYL SZ 4-0 L27IN ABSRB UD L19MM PS-2 1/2 CIR PRIM Y426H

## (undated) DEVICE — NEEDLE HYPO 25GA L1.5IN BLU POLYPR HUB S STL REG BVL STR

## (undated) DEVICE — GOWN SIRUS NONREIN XL W/TWL: Brand: MEDLINE INDUSTRIES, INC.